# Patient Record
Sex: FEMALE | Race: OTHER | NOT HISPANIC OR LATINO | URBAN - METROPOLITAN AREA
[De-identification: names, ages, dates, MRNs, and addresses within clinical notes are randomized per-mention and may not be internally consistent; named-entity substitution may affect disease eponyms.]

---

## 2017-03-02 ENCOUNTER — EMERGENCY (EMERGENCY)
Facility: HOSPITAL | Age: 77
LOS: 1 days | Discharge: HOME | End: 2017-03-02
Attending: EMERGENCY MEDICINE | Admitting: EMERGENCY MEDICINE
Payer: MEDICARE

## 2017-03-02 VITALS
HEART RATE: 76 BPM | OXYGEN SATURATION: 95 % | TEMPERATURE: 98 F | SYSTOLIC BLOOD PRESSURE: 160 MMHG | DIASTOLIC BLOOD PRESSURE: 104 MMHG | RESPIRATION RATE: 18 BRPM

## 2017-03-02 VITALS
DIASTOLIC BLOOD PRESSURE: 96 MMHG | OXYGEN SATURATION: 95 % | HEART RATE: 62 BPM | SYSTOLIC BLOOD PRESSURE: 144 MMHG | RESPIRATION RATE: 16 BRPM | TEMPERATURE: 98 F

## 2017-03-02 DIAGNOSIS — R21 RASH AND OTHER NONSPECIFIC SKIN ERUPTION: ICD-10-CM

## 2017-03-02 LAB
APTT BLD: 76.5 SEC — HIGH (ref 27.5–37.4)
BASOPHILS # BLD AUTO: 0.1 K/UL — SIGNIFICANT CHANGE UP (ref 0–0.2)
BASOPHILS NFR BLD AUTO: 1.4 % — SIGNIFICANT CHANGE UP (ref 0–2)
EOSINOPHIL # BLD AUTO: 0.2 K/UL — SIGNIFICANT CHANGE UP (ref 0–0.5)
EOSINOPHIL NFR BLD AUTO: 2.3 % — SIGNIFICANT CHANGE UP (ref 0–6)
HCT VFR BLD CALC: 46.1 % — HIGH (ref 34.5–45)
HGB BLD-MCNC: 15.8 G/DL — HIGH (ref 11.5–15.5)
INR BLD: 1.24 RATIO — HIGH (ref 0.88–1.16)
LYMPHOCYTES # BLD AUTO: 3.9 K/UL — HIGH (ref 1–3.3)
LYMPHOCYTES # BLD AUTO: 44.4 % — HIGH (ref 13–44)
MCHC RBC-ENTMCNC: 33.5 PG — SIGNIFICANT CHANGE UP (ref 27–34)
MCHC RBC-ENTMCNC: 34.2 GM/DL — SIGNIFICANT CHANGE UP (ref 32–36)
MCV RBC AUTO: 98 FL — SIGNIFICANT CHANGE UP (ref 80–100)
MONOCYTES # BLD AUTO: 0.7 K/UL — SIGNIFICANT CHANGE UP (ref 0–0.9)
MONOCYTES NFR BLD AUTO: 8.2 % — SIGNIFICANT CHANGE UP (ref 2–14)
NEUTROPHILS # BLD AUTO: 3.8 K/UL — SIGNIFICANT CHANGE UP (ref 1.8–7.4)
NEUTROPHILS NFR BLD AUTO: 43.7 % — SIGNIFICANT CHANGE UP (ref 43–77)
PLATELET # BLD AUTO: 237 K/UL — SIGNIFICANT CHANGE UP (ref 150–400)
PROTHROM AB SERPL-ACNC: 13.6 SEC — HIGH (ref 10–13.1)
RBC # BLD: 4.7 M/UL — SIGNIFICANT CHANGE UP (ref 3.8–5.2)
RBC # FLD: 12.1 % — SIGNIFICANT CHANGE UP (ref 10.3–14.5)
WBC # BLD: 8.8 K/UL — SIGNIFICANT CHANGE UP (ref 3.8–10.5)
WBC # FLD AUTO: 8.8 K/UL — SIGNIFICANT CHANGE UP (ref 3.8–10.5)

## 2017-03-02 PROCEDURE — 99283 EMERGENCY DEPT VISIT LOW MDM: CPT

## 2017-03-02 PROCEDURE — 85027 COMPLETE CBC AUTOMATED: CPT

## 2017-03-02 PROCEDURE — 99284 EMERGENCY DEPT VISIT MOD MDM: CPT

## 2017-03-02 PROCEDURE — 85730 THROMBOPLASTIN TIME PARTIAL: CPT

## 2017-03-02 PROCEDURE — 85610 PROTHROMBIN TIME: CPT

## 2017-03-02 RX ORDER — DIPHENHYDRAMINE HCL 50 MG
25 CAPSULE ORAL ONCE
Qty: 0 | Refills: 0 | Status: COMPLETED | OUTPATIENT
Start: 2017-03-02 | End: 2017-03-02

## 2017-03-02 RX ADMIN — Medication 25 MILLIGRAM(S): at 19:27

## 2017-03-02 NOTE — ED ADULT NURSE NOTE - CHPI ED SYMPTOMS NEG
no petechia/no inflammation/no scaly patches on skin/no pain/no fever/no decreased eating/drinking/no vomiting/no bruising/no chills

## 2017-03-02 NOTE — ED ADULT NURSE NOTE - OBJECTIVE STATEMENT
Per pt report she has had itching and a rash for the past several days.  Pt is A&Ox4, skin warm and dry, denies fever, chills, sob, dizziness, cough, exposure to plants or new creams.  Rash is on her arms leg and back and appears reddened and flat.

## 2017-03-02 NOTE — ED ADULT NURSE REASSESSMENT NOTE - NS ED NURSE REASSESS COMMENT FT1
Pt d/c to home. Pt denies itching at time of d/c. Pt given referral to dermatologist outpt. Son in law used for translation, pt states this is okay. Safety and comfort maintained while in ED. Pt verbalized understanding of d/c instructions.

## 2017-03-02 NOTE — ED ADULT NURSE NOTE - PMH
Arthritis (ICD9 716.90)    Chronic headaches    High Cholesterol (ICD9 272.0)    Hypertensive Disease (ICD9 401.9)

## 2017-03-02 NOTE — ED PROVIDER NOTE - OBJECTIVE STATEMENT
77 year old female with PMHx of HTN, HLD, presents with itchiness to RUE, b/l legs and back x several days. Pt states itching is worse at night and that she noted diffuse red spots over the areas yesterday. States itchiness feels like "needles" sensation.  Pt is concerned that she has bed bugs- states she saw bugs in her bed. Denies fevers, chills, recent travel, new shampoos/soaps/detergents. No recent N/V, SOB, CP, back pain, urinary complaints. 77 year old female with PMHx of HTN, HLD, presents with itchiness to RUE, b/l ankles and upper back x several days. Pt states itching is worse at night and that she notes red spots at bilateral ankles, right upper extremity,  yesterday. States itchiness feels like "needles" sensation at night. Denies fevers, chills, recent travel, new shampoos/soaps/detergents. No recent N/V, SOB, CP, back pain, urinary complaints.

## 2017-03-02 NOTE — ED PROVIDER NOTE - NS ED MD SCRIBE ATTENDING SCRIBE SECTIONS
INTAKE ASSESSMENT/SCREENINGS/VITAL SIGNS( Pullset)/HISTORY OF PRESENT ILLNESS/HIV/REVIEW OF SYSTEMS/PAST MEDICAL/SURGICAL/SOCIAL HISTORY/PHYSICAL EXAM

## 2017-03-02 NOTE — ED PROVIDER NOTE - SKIN RASH DESCRIPTION
MACULAR/pin point areas on b/l ankles and rue, not rasied, areas of excoriation on upper backs secondary to patient scratching

## 2018-06-27 ENCOUNTER — INPATIENT (INPATIENT)
Facility: HOSPITAL | Age: 78
LOS: 4 days | Discharge: HOME | DRG: 481 | End: 2018-07-02
Attending: INTERNAL MEDICINE | Admitting: INTERNAL MEDICINE
Payer: MEDICARE

## 2018-06-27 VITALS
DIASTOLIC BLOOD PRESSURE: 90 MMHG | OXYGEN SATURATION: 94 % | SYSTOLIC BLOOD PRESSURE: 133 MMHG | TEMPERATURE: 98 F | RESPIRATION RATE: 16 BRPM | HEART RATE: 80 BPM

## 2018-06-27 DIAGNOSIS — R06.02 SHORTNESS OF BREATH: ICD-10-CM

## 2018-06-27 LAB
ALBUMIN SERPL ELPH-MCNC: 4.5 G/DL — SIGNIFICANT CHANGE UP (ref 3.3–5)
ALP SERPL-CCNC: 64 U/L — SIGNIFICANT CHANGE UP (ref 40–120)
ALT FLD-CCNC: 21 U/L — SIGNIFICANT CHANGE UP (ref 10–45)
ANION GAP SERPL CALC-SCNC: 8 MMOL/L — SIGNIFICANT CHANGE UP (ref 5–17)
APTT BLD: 34.1 SEC — SIGNIFICANT CHANGE UP (ref 27.5–37.4)
AST SERPL-CCNC: 28 U/L — SIGNIFICANT CHANGE UP (ref 10–40)
BASOPHILS # BLD AUTO: 0 K/UL — SIGNIFICANT CHANGE UP (ref 0–0.2)
BASOPHILS NFR BLD AUTO: 0.3 % — SIGNIFICANT CHANGE UP (ref 0–2)
BILIRUB SERPL-MCNC: 0.5 MG/DL — SIGNIFICANT CHANGE UP (ref 0.2–1.2)
BLD GP AB SCN SERPL QL: NEGATIVE — SIGNIFICANT CHANGE UP
BUN SERPL-MCNC: 20 MG/DL — SIGNIFICANT CHANGE UP (ref 7–23)
CALCIUM SERPL-MCNC: 9.6 MG/DL — SIGNIFICANT CHANGE UP (ref 8.4–10.5)
CHLORIDE SERPL-SCNC: 98 MMOL/L — SIGNIFICANT CHANGE UP (ref 96–108)
CO2 SERPL-SCNC: 28 MMOL/L — SIGNIFICANT CHANGE UP (ref 22–31)
CREAT SERPL-MCNC: 0.64 MG/DL — SIGNIFICANT CHANGE UP (ref 0.5–1.3)
EOSINOPHIL # BLD AUTO: 0.1 K/UL — SIGNIFICANT CHANGE UP (ref 0–0.5)
EOSINOPHIL NFR BLD AUTO: 1 % — SIGNIFICANT CHANGE UP (ref 0–6)
GAS PNL BLDV: SIGNIFICANT CHANGE UP
GLUCOSE SERPL-MCNC: 114 MG/DL — HIGH (ref 70–99)
HCT VFR BLD CALC: 47.7 % — HIGH (ref 34.5–45)
HGB BLD-MCNC: 16.8 G/DL — HIGH (ref 11.5–15.5)
INR BLD: 1.38 RATIO — HIGH (ref 0.88–1.16)
LYMPHOCYTES # BLD AUTO: 3.5 K/UL — HIGH (ref 1–3.3)
LYMPHOCYTES # BLD AUTO: 37.7 % — SIGNIFICANT CHANGE UP (ref 13–44)
MCHC RBC-ENTMCNC: 35.2 GM/DL — SIGNIFICANT CHANGE UP (ref 32–36)
MCHC RBC-ENTMCNC: 35.4 PG — HIGH (ref 27–34)
MCV RBC AUTO: 101 FL — HIGH (ref 80–100)
MONOCYTES # BLD AUTO: 0.9 K/UL — SIGNIFICANT CHANGE UP (ref 0–0.9)
MONOCYTES NFR BLD AUTO: 9.8 % — SIGNIFICANT CHANGE UP (ref 2–14)
NEUTROPHILS # BLD AUTO: 4.8 K/UL — SIGNIFICANT CHANGE UP (ref 1.8–7.4)
NEUTROPHILS NFR BLD AUTO: 51.3 % — SIGNIFICANT CHANGE UP (ref 43–77)
PLATELET # BLD AUTO: 261 K/UL — SIGNIFICANT CHANGE UP (ref 150–400)
POTASSIUM SERPL-MCNC: 3.9 MMOL/L — SIGNIFICANT CHANGE UP (ref 3.5–5.3)
POTASSIUM SERPL-SCNC: 3.9 MMOL/L — SIGNIFICANT CHANGE UP (ref 3.5–5.3)
PROT SERPL-MCNC: 8 G/DL — SIGNIFICANT CHANGE UP (ref 6–8.3)
PROTHROM AB SERPL-ACNC: 15 SEC — HIGH (ref 9.8–12.7)
RBC # BLD: 4.74 M/UL — SIGNIFICANT CHANGE UP (ref 3.8–5.2)
RBC # FLD: 12.2 % — SIGNIFICANT CHANGE UP (ref 10.3–14.5)
RH IG SCN BLD-IMP: POSITIVE — SIGNIFICANT CHANGE UP
SODIUM SERPL-SCNC: 134 MMOL/L — LOW (ref 135–145)
TROPONIN T, HIGH SENSITIVITY RESULT: 10 NG/L — SIGNIFICANT CHANGE UP (ref 0–51)
WBC # BLD: 9.4 K/UL — SIGNIFICANT CHANGE UP (ref 3.8–10.5)
WBC # FLD AUTO: 9.4 K/UL — SIGNIFICANT CHANGE UP (ref 3.8–10.5)

## 2018-06-27 PROCEDURE — 73502 X-RAY EXAM HIP UNI 2-3 VIEWS: CPT | Mod: 26,RT

## 2018-06-27 PROCEDURE — 99285 EMERGENCY DEPT VISIT HI MDM: CPT | Mod: 25,GC

## 2018-06-27 PROCEDURE — 73551 X-RAY EXAM OF FEMUR 1: CPT | Mod: 26,RT

## 2018-06-27 PROCEDURE — 93010 ELECTROCARDIOGRAM REPORT: CPT | Mod: GC

## 2018-06-27 PROCEDURE — 71045 X-RAY EXAM CHEST 1 VIEW: CPT | Mod: 26

## 2018-06-27 PROCEDURE — 71275 CT ANGIOGRAPHY CHEST: CPT | Mod: 26

## 2018-06-27 RX ORDER — ATENOLOL 25 MG/1
25 TABLET ORAL DAILY
Qty: 0 | Refills: 0 | Status: DISCONTINUED | OUTPATIENT
Start: 2018-06-27 | End: 2018-06-29

## 2018-06-27 RX ORDER — MORPHINE SULFATE 50 MG/1
4 CAPSULE, EXTENDED RELEASE ORAL ONCE
Qty: 0 | Refills: 0 | Status: DISCONTINUED | OUTPATIENT
Start: 2018-06-27 | End: 2018-06-27

## 2018-06-27 RX ORDER — CALCIUM ACETATE 667 MG
667 TABLET ORAL AT BEDTIME
Qty: 0 | Refills: 0 | Status: DISCONTINUED | OUTPATIENT
Start: 2018-06-27 | End: 2018-06-29

## 2018-06-27 RX ORDER — ATORVASTATIN CALCIUM 80 MG/1
40 TABLET, FILM COATED ORAL AT BEDTIME
Qty: 0 | Refills: 0 | Status: DISCONTINUED | OUTPATIENT
Start: 2018-06-27 | End: 2018-06-29

## 2018-06-27 RX ORDER — CHOLECALCIFEROL (VITAMIN D3) 125 MCG
1000 CAPSULE ORAL DAILY
Qty: 0 | Refills: 0 | Status: DISCONTINUED | OUTPATIENT
Start: 2018-06-27 | End: 2018-06-29

## 2018-06-27 RX ORDER — ACETAMINOPHEN 500 MG
1000 TABLET ORAL ONCE
Qty: 0 | Refills: 0 | Status: COMPLETED | OUTPATIENT
Start: 2018-06-27 | End: 2018-06-27

## 2018-06-27 RX ORDER — VALSARTAN 80 MG/1
160 TABLET ORAL DAILY
Qty: 0 | Refills: 0 | Status: DISCONTINUED | OUTPATIENT
Start: 2018-06-27 | End: 2018-06-29

## 2018-06-27 RX ORDER — SODIUM CHLORIDE 9 MG/ML
1000 INJECTION INTRAMUSCULAR; INTRAVENOUS; SUBCUTANEOUS
Qty: 0 | Refills: 0 | Status: DISCONTINUED | OUTPATIENT
Start: 2018-06-27 | End: 2018-06-29

## 2018-06-27 RX ORDER — SODIUM CHLORIDE 9 MG/ML
3 INJECTION INTRAMUSCULAR; INTRAVENOUS; SUBCUTANEOUS ONCE
Qty: 0 | Refills: 0 | Status: COMPLETED | OUTPATIENT
Start: 2018-06-27 | End: 2018-06-27

## 2018-06-27 RX ORDER — AMLODIPINE BESYLATE 2.5 MG/1
5 TABLET ORAL DAILY
Qty: 0 | Refills: 0 | Status: DISCONTINUED | OUTPATIENT
Start: 2018-06-27 | End: 2018-06-29

## 2018-06-27 RX ORDER — SODIUM CHLORIDE 9 MG/ML
1000 INJECTION, SOLUTION INTRAVENOUS
Qty: 0 | Refills: 0 | Status: DISCONTINUED | OUTPATIENT
Start: 2018-06-27 | End: 2018-06-29

## 2018-06-27 RX ORDER — CEFTRIAXONE 500 MG/1
1 INJECTION, POWDER, FOR SOLUTION INTRAMUSCULAR; INTRAVENOUS ONCE
Qty: 0 | Refills: 0 | Status: DISCONTINUED | OUTPATIENT
Start: 2018-06-27 | End: 2018-06-27

## 2018-06-27 RX ORDER — HYDROCHLOROTHIAZIDE 25 MG
12.5 TABLET ORAL DAILY
Qty: 0 | Refills: 0 | Status: DISCONTINUED | OUTPATIENT
Start: 2018-06-27 | End: 2018-06-27

## 2018-06-27 RX ADMIN — Medication 1000 MILLIGRAM(S): at 23:00

## 2018-06-27 RX ADMIN — SODIUM CHLORIDE 70 MILLILITER(S): 9 INJECTION INTRAMUSCULAR; INTRAVENOUS; SUBCUTANEOUS at 14:27

## 2018-06-27 RX ADMIN — Medication 400 MILLIGRAM(S): at 22:44

## 2018-06-27 RX ADMIN — Medication 667 MILLIGRAM(S): at 22:44

## 2018-06-27 RX ADMIN — Medication 400 MILLIGRAM(S): at 14:27

## 2018-06-27 RX ADMIN — SODIUM CHLORIDE 70 MILLILITER(S): 9 INJECTION INTRAMUSCULAR; INTRAVENOUS; SUBCUTANEOUS at 22:44

## 2018-06-27 RX ADMIN — SODIUM CHLORIDE 3 MILLILITER(S): 9 INJECTION INTRAMUSCULAR; INTRAVENOUS; SUBCUTANEOUS at 12:18

## 2018-06-27 RX ADMIN — MORPHINE SULFATE 4 MILLIGRAM(S): 50 CAPSULE, EXTENDED RELEASE ORAL at 12:19

## 2018-06-27 RX ADMIN — ATORVASTATIN CALCIUM 40 MILLIGRAM(S): 80 TABLET, FILM COATED ORAL at 22:44

## 2018-06-27 NOTE — ED PROVIDER NOTE - MEDICAL DECISION MAKING DETAILS
Attending note. Fall with obvious deformity to right hip/proximal femur was likely fracture. Patient with shortness of breath and hypoxia.  Consider fat embolus. Transferred patient from [facility] to [facility] for further evaluation. Analgesia now.
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
## 2018-06-27 NOTE — ED ADULT NURSE NOTE - ED STAT RN HANDOFF DETAILS
report given to ADITHYA Amaya in [place]. Pt. transferred w/ full tank of O2. Aware pt. is on 4L NC.
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
## 2018-06-27 NOTE — ED PROVIDER NOTE - CARE PLAN
Principal Discharge DX:	Shortness of breath  Secondary Diagnosis:	Intertrochanteric fracture of right femur, closed, initial encounter

## 2018-06-27 NOTE — ED ADULT NURSE REASSESSMENT NOTE - NS ED NURSE REASSESS COMMENT FT1
report received from Fast Track ADITHYA Rodriguez. Pt states pain is "better" after medication administration; oxygen administered via nc at 4L. Pt transported to Ct for imaging, will reassess upon return to unit

## 2018-06-27 NOTE — ED PROVIDER NOTE - PHYSICAL EXAMINATION
Attending note. Patient is alert and in severe pain. Head is normocephalic and atraumatic. Pupils are 3 mm equal reactive. There is no cervical, thoracic or lumbar tenderness. Chest left ribs are nontender. Lungs are clear equal bilaterally. Heart is regular rate and rhythm without murmur. Abdomen is soft and nontender. Pelvis is nontender. Patient is able to move upper extremities with out pain or tenderness. Patient is tenderness and obvious deformity of the right hip. There is no other tenderness in the lower extremities. Distal pulses were intact. Sensation is intact. Patient has good capillary refill.

## 2018-06-27 NOTE — ED PROVIDER NOTE - PROGRESS NOTE DETAILS
transfer of care to me from ft. prox femur fx likely, now hypoxic - unclear reason. concern for fat embolism. -Marcel Díaz MD- transfer of care to me from ft. prox femur fx likely, now hypoxic - unclear reason. concern for fat embolism. on eliquis, no cardiac / pulm hx. -Marcel Díaz MD- d/w orthopedics for intertrochanteric fx, will see pt, advised of hypoxia episode, will cont to monitor.

## 2018-06-28 ENCOUNTER — TRANSCRIPTION ENCOUNTER (OUTPATIENT)
Age: 78
End: 2018-06-28

## 2018-06-28 LAB
ANION GAP SERPL CALC-SCNC: 14 MMOL/L — SIGNIFICANT CHANGE UP (ref 5–17)
APTT BLD: 27.2 SEC — LOW (ref 27.5–37.4)
BUN SERPL-MCNC: 14 MG/DL — SIGNIFICANT CHANGE UP (ref 7–23)
CALCIUM SERPL-MCNC: 8.9 MG/DL — SIGNIFICANT CHANGE UP (ref 8.4–10.5)
CHLORIDE SERPL-SCNC: 92 MMOL/L — LOW (ref 96–108)
CHOLEST SERPL-MCNC: 153 MG/DL — SIGNIFICANT CHANGE UP (ref 10–199)
CO2 SERPL-SCNC: 27 MMOL/L — SIGNIFICANT CHANGE UP (ref 22–31)
CREAT SERPL-MCNC: 0.56 MG/DL — SIGNIFICANT CHANGE UP (ref 0.5–1.3)
GLUCOSE SERPL-MCNC: 87 MG/DL — SIGNIFICANT CHANGE UP (ref 70–99)
HCT VFR BLD CALC: 45.8 % — HIGH (ref 34.5–45)
HDLC SERPL-MCNC: 64 MG/DL — SIGNIFICANT CHANGE UP (ref 40–125)
HGB BLD-MCNC: 15.5 G/DL — SIGNIFICANT CHANGE UP (ref 11.5–15.5)
INR BLD: 1.15 RATIO — SIGNIFICANT CHANGE UP (ref 0.88–1.16)
LIPID PNL WITH DIRECT LDL SERPL: 68 MG/DL — SIGNIFICANT CHANGE UP
MCHC RBC-ENTMCNC: 33.7 GM/DL — SIGNIFICANT CHANGE UP (ref 32–36)
MCHC RBC-ENTMCNC: 33.7 PG — SIGNIFICANT CHANGE UP (ref 27–34)
MCV RBC AUTO: 99.8 FL — SIGNIFICANT CHANGE UP (ref 80–100)
PLATELET # BLD AUTO: 214 K/UL — SIGNIFICANT CHANGE UP (ref 150–400)
POTASSIUM SERPL-MCNC: 3.2 MMOL/L — LOW (ref 3.5–5.3)
POTASSIUM SERPL-SCNC: 3.2 MMOL/L — LOW (ref 3.5–5.3)
PROTHROM AB SERPL-ACNC: 12.5 SEC — SIGNIFICANT CHANGE UP (ref 9.8–12.7)
RBC # BLD: 4.59 M/UL — SIGNIFICANT CHANGE UP (ref 3.8–5.2)
RBC # FLD: 12.1 % — SIGNIFICANT CHANGE UP (ref 10.3–14.5)
SODIUM SERPL-SCNC: 133 MMOL/L — LOW (ref 135–145)
TOTAL CHOLESTEROL/HDL RATIO MEASUREMENT: 2.4 RATIO — LOW (ref 3.3–7.1)
TRIGL SERPL-MCNC: 107 MG/DL — SIGNIFICANT CHANGE UP (ref 10–149)
TSH SERPL-MCNC: 0.7 UIU/ML — SIGNIFICANT CHANGE UP (ref 0.27–4.2)
WBC # BLD: 9.3 K/UL — SIGNIFICANT CHANGE UP (ref 3.8–10.5)
WBC # FLD AUTO: 9.3 K/UL — SIGNIFICANT CHANGE UP (ref 3.8–10.5)

## 2018-06-28 PROCEDURE — 93010 ELECTROCARDIOGRAM REPORT: CPT

## 2018-06-28 RX ORDER — POTASSIUM CHLORIDE 20 MEQ
40 PACKET (EA) ORAL ONCE
Qty: 0 | Refills: 0 | Status: COMPLETED | OUTPATIENT
Start: 2018-06-28 | End: 2018-06-28

## 2018-06-28 RX ORDER — ACETAMINOPHEN 500 MG
1000 TABLET ORAL ONCE
Qty: 0 | Refills: 0 | Status: COMPLETED | OUTPATIENT
Start: 2018-06-28 | End: 2018-06-28

## 2018-06-28 RX ADMIN — VALSARTAN 160 MILLIGRAM(S): 80 TABLET ORAL at 05:26

## 2018-06-28 RX ADMIN — Medication 40 MILLIEQUIVALENT(S): at 11:07

## 2018-06-28 RX ADMIN — ATENOLOL 25 MILLIGRAM(S): 25 TABLET ORAL at 05:26

## 2018-06-28 RX ADMIN — Medication 400 MILLIGRAM(S): at 11:08

## 2018-06-28 RX ADMIN — AMLODIPINE BESYLATE 5 MILLIGRAM(S): 2.5 TABLET ORAL at 05:26

## 2018-06-28 RX ADMIN — Medication 1000 MILLIGRAM(S): at 11:38

## 2018-06-28 RX ADMIN — Medication 400 MILLIGRAM(S): at 19:34

## 2018-06-28 RX ADMIN — Medication 667 MILLIGRAM(S): at 21:30

## 2018-06-28 RX ADMIN — ATORVASTATIN CALCIUM 40 MILLIGRAM(S): 80 TABLET, FILM COATED ORAL at 21:30

## 2018-06-28 RX ADMIN — Medication 1000 UNIT(S): at 11:08

## 2018-06-28 RX ADMIN — SODIUM CHLORIDE 70 MILLILITER(S): 9 INJECTION INTRAMUSCULAR; INTRAVENOUS; SUBCUTANEOUS at 05:42

## 2018-06-28 RX ADMIN — Medication 1000 MILLIGRAM(S): at 20:04

## 2018-06-28 RX ADMIN — Medication 40 MILLIEQUIVALENT(S): at 19:03

## 2018-06-28 RX ADMIN — SODIUM CHLORIDE 50 MILLILITER(S): 9 INJECTION, SOLUTION INTRAVENOUS at 00:07

## 2018-06-28 NOTE — CONSULT NOTE ADULT - SUBJECTIVE AND OBJECTIVE BOX
78y Female community ambulatory presents c/o R/L hip pain and inability to ambulate sp mechanical fall. Denies HS/LOC. Denies numbness/tingling. Denies fever/chills. Denies pain/injury elsewhere. Pt [place] previously for possible total knee replacement options 2/2 OA. No other orthopedic concerns at this time.     Shortness of breath  Handoff  MEWS Score  Chronic headaches  Arthritis (ICD9 716.90)  High Cholesterol (ICD9 272.0)  Hypertensive Disease (ICD9 401.9)  Shortness of breath  No Past Medical History  FALL/ RIGHT HIP INJURY  90+  Intertrochanteric fracture of right femur, closed, initial encounter    PAST MEDICAL & SURGICAL HISTORY:  Chronic headaches  Arthritis (ICD9 716.90)  High Cholesterol (ICD9 272.0)  Hypertensive Disease (ICD9 401.9)  No Past Medical History    MEDICATIONS  (STANDING):  amLODIPine   Tablet 5 milliGRAM(s) Oral daily  ATENolol  Tablet 25 milliGRAM(s) Oral daily  atorvastatin 40 milliGRAM(s) Oral at bedtime  calcium acetate 667 milliGRAM(s) Oral at bedtime  cholecalciferol 1000 Unit(s) Oral daily  lactated ringers. 1000 milliLiter(s) IV Continuous <Continuous>  sodium chloride 0.9%. 1000 milliLiter(s) IV Continuous <Continuous>  valsartan 160 milliGRAM(s) Oral daily    Allergies  No Known Allergies  Intolerances                        16.8   9.4   )-----------( 261      ( 27 Jun 2018 12:12 )             47.7     27 Jun 2018 12:12    134    |  98     |  20     ----------------------------<  114    3.9     |  28     |  0.64     Ca    9.6        27 Jun 2018 12:12    TPro  8.0    /  Alb  4.5    /  TBili  0.5    /  DBili  x      /  AST  28     /  ALT  21     /  AlkPhos  64     27 Jun 2018 12:12  PT/INR - ( 27 Jun 2018 12:12 )   PT: 15.0 sec;   INR: 1.38 ratio    PTT - ( 27 Jun 2018 12:12 )  PTT:34.1 sec    Vital Signs Last 24 Hrs  T(C): 36.6 (06-27-18 @ 20:20), Max: 36.7 (06-27-18 @ 11:30)  T(F): 97.8 (06-27-18 @ 20:20), Max: 98 (06-27-18 @ 11:30)  HR: 78 (06-27-18 @ 20:20) (71 - 80)  BP: 123/86 (06-27-18 @ 20:20) (123/86 - 149/94)  BP(mean): --  RR: 18 (06-27-18 @ 20:20) (16 - 20)  SpO2: 96% (06-27-18 @ 20:20) (89% - 97%)    Imaging: XR demonstrates R IT Fx    Physical Exam  General: NAD, Alert, Awake and oriented  Neurologic: No gross deficits, moving all 4s.  Head: NCAT without abrasions, lacerations, or ecchymosis to head, face, or scalp    RIGHT LE: No open skin. ExternallyRotated and shortened. No deformities or other signs of trauma at  knee, lower leg, ankle or foot. Full baseline painless ROM at ankle and toes with. EHL/FHL/TA/GSC 5/5,  SILT L3-S1, 2+ DP/PT pulses with brisk cap refill distally. Compartments soft and compressible.     Secondary Survey: No TTP over bony prominences, SILT, palpable pulses, full/painless range of motion, compartments soft

## 2018-06-28 NOTE — PROGRESS NOTE ADULT - SUBJECTIVE AND OBJECTIVE BOX
_________________________________________________________________________________________  ========>>  M E D I C A L   A T T E N D I N G    F O L L O W  U P  N O T E  <<=========  -----------------------------------------------------------------------------------------------------    - Patient seen and examined by me approximately thirty minutes ago.  - In summary, patient is a 78y year old woman who originally presented post fall, hip fracture   - Patient today overall doing ok, comfortable, eating OK. pain controlled with no movement!     ==================>> REVIEW OF SYSTEM <<=================    GEN: no fever, no chills, no pain a rest   RESP: no SOB, no cough, no sputum  CVS: no chest pain, no palpitations, no edema  GI: no abdominal pain, no nausea, no constipation, no diarrhea  : no dysuria, no frequency, no hematuria  Neuro: no headache, no dizziness  Derm : no itching, no rash    ==================>> PHYSICAL EXAM <<=================    GEN: A&O X 3 , NAD , comfortable  HEENT: NCAT, PERRL, MMM, hearing intact  Neck: supple , no JVD  CVS: S1S2 , regular , No M/R/G appreciated  PULM: CTA B/L,  no W/R/R appreciated  ABD.: soft. non tender, non distended,  bowel sounds present  Extrem: intact pulses , no edema   PSYCH : normal mood,  a bit anxious /nervous       ==================>> MEDICATIONS <<====================    MEDICATIONS  (STANDING):  acetaminophen  IVPB. 1000 milliGRAM(s) IV Intermittent once  amLODIPine   Tablet 5 milliGRAM(s) Oral daily  ATENolol  Tablet 25 milliGRAM(s) Oral daily  atorvastatin 40 milliGRAM(s) Oral at bedtime  calcium acetate 667 milliGRAM(s) Oral at bedtime  cholecalciferol 1000 Unit(s) Oral daily  lactated ringers. 1000 milliLiter(s) (50 mL/Hr) IV Continuous <Continuous>  potassium chloride    Tablet ER 40 milliEquivalent(s) Oral once  sodium chloride 0.9%. 1000 milliLiter(s) (70 mL/Hr) IV Continuous <Continuous>  valsartan 160 milliGRAM(s) Oral daily    ==================>> VITAL SIGNS <<==================    T(C): 36.8 (06-28-18 @ 04:27), Max: 36.8 (06-28-18 @ 04:27)  HR: 80 (06-28-18 @ 04:27) (71 - 80)  BP: 144/93 (06-28-18 @ 04:27) (123/86 - 149/94)  RR: 18 (06-28-18 @ 04:27) (16 - 20)  SpO2: 94% (06-28-18 @ 04:27) (89% - 97%)    I&O's Summary    27 Jun 2018 07:01  -  28 Jun 2018 07:00  --------------------------------------------------------  IN: 600 mL / OUT: 450 mL / NET: 150 mL    28 Jun 2018 07:01  -  28 Jun 2018 10:35  --------------------------------------------------------  IN: 240 mL / OUT: 200 mL / NET: 40 mL    ==================>> LAB AND IMAGING <<==================                        15.5   9.3   )-----------( 214      ( 28 Jun 2018 05:53 )             45.8     Hemoglobin:   15.5 <<==,  16.8 <<==    133<L>  |  92<L>  |  14  ----------------------------<  87  3.2<L>   |  27  |  0.56    Sodium:   133  <==, 134  <==    Ca    8.9      28 Jun 2018 05:53    TPro  8.0  /  Alb  4.5  /  TBili  0.5  /  DBili  x   /  AST  28  /  ALT  21  /  AlkPhos  64  06-27    PT/INR - ( 28 Jun 2018 05:53 )   PT: 12.5 sec;   INR: 1.15 ratio    PTT - ( 28 Jun 2018 05:53 )  PTT:27.2 sec              imaging reviewed   ___________________________________________________________________________________  ===============>>  A S S E S S M E N T   A N D   P L A N <<===============  ------------------------------------------------------------------------------------------    **fall, likely mechanical in nature, with Rt hip fracture  --- surgical plan / repair per ortho  ---pain mgmt  ---DVT PPX  ---eliquis on hold   --- cardio f/u and clearance for OR  --- supplementing hypokalemia, stable hyponatremia  ---otherwise medically stable for OR    **Afib, stable  ---holding AC as above for Sx  --- rate control  ---monitor vitals closely  ---cardio f/u    **Arthritis   pt was being worker up and prepared for a knee replacement for severere OA of knee  [place] f.u  Pain mgmt  PT post op    **High Cholesterol   --- Continue Current medications and monitor.     **Hypertension  ---Continue with antihypertensive medications as above  ---DASH diet   ---close monitoring of vitals    **hyponatremia, possible SIADH due to pain,improved  -- encourage PO intkae  -- monitor  -- supplementing hypokalemia monitor     -GI/DVT Prophylaxis.    --------------------------------------------  Case discussed with pt, Dtr, Rn  Education given on findings and plan of care.  ___________________________  Will follow with you.  Thank you,  SHAZIA Casas D.O.  Pager: [phone]

## 2018-06-28 NOTE — PROGRESS NOTE ADULT - ASSESSMENT
A/P: 78F w/ R IT Fx  pain control  NWB RLE, bedrest  Hold Eliquis  Medical optimization/clearance   NPO at mid night for OR  IVF while NPO  Plan for OR 6/29

## 2018-06-28 NOTE — PROGRESS NOTE ADULT - SUBJECTIVE AND OBJECTIVE BOX
- Patient seen and examined.  - In summary, patient is a 78 year old woman who presented with hip fx, a.fib (27 Jun 2018 17:44)  - Today, patient is without complaints.         *****MEDICATIONS:    MEDICATIONS  (STANDING):  amLODIPine   Tablet 5 milliGRAM(s) Oral daily  ATENolol  Tablet 25 milliGRAM(s) Oral daily  atorvastatin 40 milliGRAM(s) Oral at bedtime  calcium acetate 667 milliGRAM(s) Oral at bedtime  cholecalciferol 1000 Unit(s) Oral daily  lactated ringers. 1000 milliLiter(s) (50 mL/Hr) IV Continuous <Continuous>  sodium chloride 0.9%. 1000 milliLiter(s) (70 mL/Hr) IV Continuous <Continuous>  valsartan 160 milliGRAM(s) Oral daily    MEDICATIONS  (PRN):           ***** REVIEW OF SYSTEM:  GEN: no fever, no chills, no pain  RESP: no SOB, no cough, no sputum  CVS: no chest pain, no palpitations, no edema  GI: no abdominal pain, no nausea, no vomiting, no constipation, no diarrhea  : no dysuria, no frequency  NEURO: no headache, no dizziness  PSYCH: no depression, not anxious  Derm : no itching, no rash         ***** VITAL SIGNS:  T(F): 98.3 (06-28-18 @ 04:27), Max: 98.3 (06-28-18 @ 04:27)  HR: 80 (06-28-18 @ 04:27) (71 - 80)  BP: 144/93 (06-28-18 @ 04:27) (123/86 - 149/94)  RR: 18 (06-28-18 @ 04:27) (16 - 20)  SpO2: 94% (06-28-18 @ 04:27) (89% - 97%)  Wt(kg): --  ,   I&O's Summary    27 Jun 2018 07:01  -  28 Jun 2018 07:00  --------------------------------------------------------  IN: 600 mL / OUT: 450 mL / NET: 150 mL    28 Jun 2018 07:01  -  28 Jun 2018 11:13  --------------------------------------------------------  IN: 240 mL / OUT: 200 mL / NET: 40 mL             *****PHYSICAL EXAM:  GEN: A&O X 3 , NAD , comfortable  HEENT: NCAT, EOMI, MMM, no icterus  NECK: Supple, No JVD  CVS: S1S2 , regular , No M/R/G appreciated  PULM: CTA B/L,  no W/R/R appreciated  ABD.: soft. non tender, non distended,  bowel sounds present  Extrem: intact pulses , no edema noted  Derm: No rash or ecchymosis noted  PSYCH: normal mood, no depression, not anxious         *****LAB AND IMAGING:                        15.5   9.3   )-----------( 214      ( 28 Jun 2018 05:53 )             45.8               06-28    133<L>  |  92<L>  |  14  ----------------------------<  87  3.2<L>   |  27  |  0.56    Ca    8.9      28 Jun 2018 05:53    TPro  8.0  /  Alb  4.5  /  TBili  0.5  /  DBili  x   /  AST  28  /  ALT  21  /  AlkPhos  64  06-27    PT/INR - ( 28 Jun 2018 05:53 )   PT: 12.5 sec;   INR: 1.15 ratio         PTT - ( 28 Jun 2018 05:53 )  PTT:27.2 sec                     [All pertinent recent Imaging/Reports reviewed]         *****A S S E S S M E N T   A N D   P L A N :  78F with hx of a.fib, htn adm s/p fall.  XR shows acute comminuted intertrochanteric fracture of the right femur  ortho eval noted  planned for OR  eliquis being held  continue current meds  pain controlled    __________________________  SAMY Haskins D.O.

## 2018-06-28 NOTE — PROGRESS NOTE ADULT - SUBJECTIVE AND OBJECTIVE BOX
Pt S/E at bedside, no acute events overnight, pain controlled    Vital Signs Last 24 Hrs  T(C): 36.8 (28 Jun 2018 04:27), Max: 36.8 (28 Jun 2018 04:27)  T(F): 98.3 (28 Jun 2018 04:27), Max: 98.3 (28 Jun 2018 04:27)  HR: 80 (28 Jun 2018 04:27) (71 - 80)  BP: 144/93 (28 Jun 2018 04:27) (123/86 - 149/94)  RR: 18 (28 Jun 2018 04:27) (16 - 20)  SpO2: 94% (28 Jun 2018 04:27) (89% - 97%)    Gen: NAD, AAOx3    Right Lower Extremity:  Skin intact  +EHL/FHL/TA/GS  SILT L3-S1  +DP/PT Pulses  Compartments soft  No calf TTP B/L

## 2018-06-28 NOTE — CHART NOTE - NSCHARTNOTEFT_GEN_A_CORE
pt with hx of chronic a.fib with s/p hip fx  pt denies of any chest pain or sob and is very active.  pt is clear for hip surgery with mod risk  AC post op   monitor hr

## 2018-06-29 LAB
ANION GAP SERPL CALC-SCNC: 13 MMOL/L — SIGNIFICANT CHANGE UP (ref 5–17)
ANION GAP SERPL CALC-SCNC: 16 MMOL/L — SIGNIFICANT CHANGE UP (ref 5–17)
APTT BLD: 26.5 SEC — LOW (ref 27.5–37.4)
BUN SERPL-MCNC: 11 MG/DL — SIGNIFICANT CHANGE UP (ref 7–23)
BUN SERPL-MCNC: 9 MG/DL — SIGNIFICANT CHANGE UP (ref 7–23)
CALCIUM SERPL-MCNC: 8.3 MG/DL — LOW (ref 8.4–10.5)
CALCIUM SERPL-MCNC: 8.9 MG/DL — SIGNIFICANT CHANGE UP (ref 8.4–10.5)
CHLORIDE SERPL-SCNC: 90 MMOL/L — LOW (ref 96–108)
CHLORIDE SERPL-SCNC: 92 MMOL/L — LOW (ref 96–108)
CO2 SERPL-SCNC: 25 MMOL/L — SIGNIFICANT CHANGE UP (ref 22–31)
CO2 SERPL-SCNC: 27 MMOL/L — SIGNIFICANT CHANGE UP (ref 22–31)
CREAT SERPL-MCNC: 0.51 MG/DL — SIGNIFICANT CHANGE UP (ref 0.5–1.3)
CREAT SERPL-MCNC: 0.62 MG/DL — SIGNIFICANT CHANGE UP (ref 0.5–1.3)
GLUCOSE SERPL-MCNC: 135 MG/DL — HIGH (ref 70–99)
GLUCOSE SERPL-MCNC: 195 MG/DL — HIGH (ref 70–99)
HCT VFR BLD CALC: 45.7 % — HIGH (ref 34.5–45)
HCT VFR BLD CALC: 49.3 % — HIGH (ref 34.5–45)
HGB BLD-MCNC: 15.3 G/DL — SIGNIFICANT CHANGE UP (ref 11.5–15.5)
HGB BLD-MCNC: 15.7 G/DL — HIGH (ref 11.5–15.5)
INR BLD: 1.04 RATIO — SIGNIFICANT CHANGE UP (ref 0.88–1.16)
MCHC RBC-ENTMCNC: 31 GM/DL — LOW (ref 32–36)
MCHC RBC-ENTMCNC: 31.4 PG — SIGNIFICANT CHANGE UP (ref 27–34)
MCHC RBC-ENTMCNC: 33.3 PG — SIGNIFICANT CHANGE UP (ref 27–34)
MCHC RBC-ENTMCNC: 34.4 GM/DL — SIGNIFICANT CHANGE UP (ref 32–36)
MCV RBC AUTO: 101 FL — HIGH (ref 80–100)
MCV RBC AUTO: 97 FL — SIGNIFICANT CHANGE UP (ref 80–100)
PLATELET # BLD AUTO: 193 K/UL — SIGNIFICANT CHANGE UP (ref 150–400)
PLATELET # BLD AUTO: 219 K/UL — SIGNIFICANT CHANGE UP (ref 150–400)
POTASSIUM SERPL-MCNC: 4.4 MMOL/L — SIGNIFICANT CHANGE UP (ref 3.5–5.3)
POTASSIUM SERPL-MCNC: 4.7 MMOL/L — SIGNIFICANT CHANGE UP (ref 3.5–5.3)
POTASSIUM SERPL-SCNC: 4.4 MMOL/L — SIGNIFICANT CHANGE UP (ref 3.5–5.3)
POTASSIUM SERPL-SCNC: 4.7 MMOL/L — SIGNIFICANT CHANGE UP (ref 3.5–5.3)
PROTHROM AB SERPL-ACNC: 11.8 SEC — SIGNIFICANT CHANGE UP (ref 10–13.1)
RBC # BLD: 4.71 M/UL — SIGNIFICANT CHANGE UP (ref 3.8–5.2)
RBC # BLD: 4.88 M/UL — SIGNIFICANT CHANGE UP (ref 3.8–5.2)
RBC # FLD: 12.1 % — SIGNIFICANT CHANGE UP (ref 10.3–14.5)
RBC # FLD: 13.2 % — SIGNIFICANT CHANGE UP (ref 10.3–14.5)
SODIUM SERPL-SCNC: 131 MMOL/L — LOW (ref 135–145)
SODIUM SERPL-SCNC: 132 MMOL/L — LOW (ref 135–145)
WBC # BLD: 11.27 K/UL — HIGH (ref 3.8–10.5)
WBC # BLD: 14.7 K/UL — HIGH (ref 3.8–10.5)
WBC # FLD AUTO: 11.27 K/UL — HIGH (ref 3.8–10.5)
WBC # FLD AUTO: 14.7 K/UL — HIGH (ref 3.8–10.5)

## 2018-06-29 PROCEDURE — 71045 X-RAY EXAM CHEST 1 VIEW: CPT | Mod: 26

## 2018-06-29 PROCEDURE — 73552 X-RAY EXAM OF FEMUR 2/>: CPT | Mod: 26,RT

## 2018-06-29 PROCEDURE — 73502 X-RAY EXAM HIP UNI 2-3 VIEWS: CPT | Mod: 26,RT

## 2018-06-29 RX ORDER — APIXABAN 2.5 MG/1
2.5 TABLET, FILM COATED ORAL EVERY 12 HOURS
Qty: 0 | Refills: 0 | Status: DISCONTINUED | OUTPATIENT
Start: 2018-06-29 | End: 2018-06-29

## 2018-06-29 RX ORDER — OXYCODONE HYDROCHLORIDE 5 MG/1
10 TABLET ORAL EVERY 4 HOURS
Qty: 0 | Refills: 0 | Status: DISCONTINUED | OUTPATIENT
Start: 2018-06-29 | End: 2018-07-02

## 2018-06-29 RX ORDER — MORPHINE SULFATE 50 MG/1
2 CAPSULE, EXTENDED RELEASE ORAL EVERY 4 HOURS
Qty: 0 | Refills: 0 | Status: DISCONTINUED | OUTPATIENT
Start: 2018-06-29 | End: 2018-07-02

## 2018-06-29 RX ORDER — CALCIUM ACETATE 667 MG
667 TABLET ORAL AT BEDTIME
Qty: 0 | Refills: 0 | Status: DISCONTINUED | OUTPATIENT
Start: 2018-06-29 | End: 2018-07-02

## 2018-06-29 RX ORDER — ACETAMINOPHEN 500 MG
650 TABLET ORAL EVERY 6 HOURS
Qty: 0 | Refills: 0 | Status: DISCONTINUED | OUTPATIENT
Start: 2018-06-29 | End: 2018-07-02

## 2018-06-29 RX ORDER — CEFAZOLIN SODIUM 1 G
2000 VIAL (EA) INJECTION EVERY 8 HOURS
Qty: 0 | Refills: 0 | Status: COMPLETED | OUTPATIENT
Start: 2018-06-29 | End: 2018-06-29

## 2018-06-29 RX ORDER — AMLODIPINE BESYLATE 2.5 MG/1
5 TABLET ORAL DAILY
Qty: 0 | Refills: 0 | Status: DISCONTINUED | OUTPATIENT
Start: 2018-06-29 | End: 2018-07-02

## 2018-06-29 RX ORDER — POLYETHYLENE GLYCOL 3350 17 G/17G
17 POWDER, FOR SOLUTION ORAL DAILY
Qty: 0 | Refills: 0 | Status: DISCONTINUED | OUTPATIENT
Start: 2018-06-29 | End: 2018-07-02

## 2018-06-29 RX ORDER — SODIUM CHLORIDE 9 MG/ML
1000 INJECTION, SOLUTION INTRAVENOUS
Qty: 0 | Refills: 0 | Status: DISCONTINUED | OUTPATIENT
Start: 2018-06-29 | End: 2018-07-01

## 2018-06-29 RX ORDER — SENNA PLUS 8.6 MG/1
2 TABLET ORAL AT BEDTIME
Qty: 0 | Refills: 0 | Status: DISCONTINUED | OUTPATIENT
Start: 2018-06-29 | End: 2018-07-02

## 2018-06-29 RX ORDER — ONDANSETRON 8 MG/1
4 TABLET, FILM COATED ORAL ONCE
Qty: 0 | Refills: 0 | Status: DISCONTINUED | OUTPATIENT
Start: 2018-06-29 | End: 2018-06-29

## 2018-06-29 RX ORDER — OXYCODONE HYDROCHLORIDE 5 MG/1
5 TABLET ORAL EVERY 4 HOURS
Qty: 0 | Refills: 0 | Status: DISCONTINUED | OUTPATIENT
Start: 2018-06-29 | End: 2018-07-02

## 2018-06-29 RX ORDER — ATENOLOL 25 MG/1
25 TABLET ORAL DAILY
Qty: 0 | Refills: 0 | Status: DISCONTINUED | OUTPATIENT
Start: 2018-06-29 | End: 2018-07-02

## 2018-06-29 RX ORDER — HYDROMORPHONE HYDROCHLORIDE 2 MG/ML
0.5 INJECTION INTRAMUSCULAR; INTRAVENOUS; SUBCUTANEOUS
Qty: 0 | Refills: 0 | Status: DISCONTINUED | OUTPATIENT
Start: 2018-06-29 | End: 2018-06-29

## 2018-06-29 RX ORDER — HYDROMORPHONE HYDROCHLORIDE 2 MG/ML
0.25 INJECTION INTRAMUSCULAR; INTRAVENOUS; SUBCUTANEOUS
Qty: 0 | Refills: 0 | Status: DISCONTINUED | OUTPATIENT
Start: 2018-06-29 | End: 2018-07-02

## 2018-06-29 RX ORDER — DIPHENHYDRAMINE HCL 50 MG
25 CAPSULE ORAL AT BEDTIME
Qty: 0 | Refills: 0 | Status: DISCONTINUED | OUTPATIENT
Start: 2018-06-29 | End: 2018-07-02

## 2018-06-29 RX ORDER — HEPARIN SODIUM 5000 [USP'U]/ML
5000 INJECTION INTRAVENOUS; SUBCUTANEOUS EVERY 12 HOURS
Qty: 0 | Refills: 0 | Status: DISCONTINUED | OUTPATIENT
Start: 2018-06-29 | End: 2018-06-29

## 2018-06-29 RX ORDER — HYDROCHLOROTHIAZIDE 25 MG
12.5 TABLET ORAL DAILY
Qty: 0 | Refills: 0 | Status: DISCONTINUED | OUTPATIENT
Start: 2018-06-29 | End: 2018-07-02

## 2018-06-29 RX ORDER — APIXABAN 2.5 MG/1
5 TABLET, FILM COATED ORAL EVERY 12 HOURS
Qty: 0 | Refills: 0 | Status: DISCONTINUED | OUTPATIENT
Start: 2018-06-29 | End: 2018-07-02

## 2018-06-29 RX ORDER — ATORVASTATIN CALCIUM 80 MG/1
40 TABLET, FILM COATED ORAL AT BEDTIME
Qty: 0 | Refills: 0 | Status: DISCONTINUED | OUTPATIENT
Start: 2018-06-29 | End: 2018-07-02

## 2018-06-29 RX ORDER — DOCUSATE SODIUM 100 MG
100 CAPSULE ORAL THREE TIMES A DAY
Qty: 0 | Refills: 0 | Status: DISCONTINUED | OUTPATIENT
Start: 2018-06-29 | End: 2018-07-02

## 2018-06-29 RX ORDER — BENZOCAINE AND MENTHOL 5; 1 G/100ML; G/100ML
1 LIQUID ORAL EVERY 6 HOURS
Qty: 0 | Refills: 0 | Status: DISCONTINUED | OUTPATIENT
Start: 2018-06-29 | End: 2018-07-02

## 2018-06-29 RX ORDER — HEPARIN SODIUM 5000 [USP'U]/ML
5000 INJECTION INTRAVENOUS; SUBCUTANEOUS EVERY 8 HOURS
Qty: 0 | Refills: 0 | Status: DISCONTINUED | OUTPATIENT
Start: 2018-06-29 | End: 2018-06-29

## 2018-06-29 RX ORDER — ONDANSETRON 8 MG/1
4 TABLET, FILM COATED ORAL EVERY 6 HOURS
Qty: 0 | Refills: 0 | Status: DISCONTINUED | OUTPATIENT
Start: 2018-06-29 | End: 2018-07-02

## 2018-06-29 RX ORDER — CHOLECALCIFEROL (VITAMIN D3) 125 MCG
1000 CAPSULE ORAL DAILY
Qty: 0 | Refills: 0 | Status: DISCONTINUED | OUTPATIENT
Start: 2018-06-29 | End: 2018-07-02

## 2018-06-29 RX ADMIN — ATENOLOL 25 MILLIGRAM(S): 25 TABLET ORAL at 05:20

## 2018-06-29 RX ADMIN — SODIUM CHLORIDE 50 MILLILITER(S): 9 INJECTION, SOLUTION INTRAVENOUS at 12:00

## 2018-06-29 RX ADMIN — Medication 100 MILLIGRAM(S): at 23:48

## 2018-06-29 RX ADMIN — SODIUM CHLORIDE 50 MILLILITER(S): 9 INJECTION, SOLUTION INTRAVENOUS at 00:35

## 2018-06-29 RX ADMIN — Medication 1 TABLET(S): at 21:30

## 2018-06-29 RX ADMIN — Medication 100 MILLIGRAM(S): at 14:50

## 2018-06-29 RX ADMIN — HEPARIN SODIUM 5000 UNIT(S): 5000 INJECTION INTRAVENOUS; SUBCUTANEOUS at 17:55

## 2018-06-29 RX ADMIN — HYDROMORPHONE HYDROCHLORIDE 0.25 MILLIGRAM(S): 2 INJECTION INTRAMUSCULAR; INTRAVENOUS; SUBCUTANEOUS at 11:45

## 2018-06-29 RX ADMIN — Medication 100 MILLIGRAM(S): at 21:30

## 2018-06-29 RX ADMIN — VALSARTAN 160 MILLIGRAM(S): 80 TABLET ORAL at 05:20

## 2018-06-29 RX ADMIN — APIXABAN 5 MILLIGRAM(S): 2.5 TABLET, FILM COATED ORAL at 19:35

## 2018-06-29 RX ADMIN — AMLODIPINE BESYLATE 5 MILLIGRAM(S): 2.5 TABLET ORAL at 05:20

## 2018-06-29 RX ADMIN — HYDROMORPHONE HYDROCHLORIDE 0.25 MILLIGRAM(S): 2 INJECTION INTRAMUSCULAR; INTRAVENOUS; SUBCUTANEOUS at 11:55

## 2018-06-29 RX ADMIN — Medication 1 TABLET(S): at 14:50

## 2018-06-29 RX ADMIN — ATORVASTATIN CALCIUM 40 MILLIGRAM(S): 80 TABLET, FILM COATED ORAL at 21:30

## 2018-06-29 RX ADMIN — Medication 12.5 MILLIGRAM(S): at 14:50

## 2018-06-29 RX ADMIN — Medication 100 MILLIGRAM(S): at 16:04

## 2018-06-29 RX ADMIN — Medication 667 MILLIGRAM(S): at 21:30

## 2018-06-29 NOTE — PROGRESS NOTE ADULT - SUBJECTIVE AND OBJECTIVE BOX
- Patient seen and examined.  - In summary, patient is a 78 year old woman who presented with hip fx, a.fib (27 Jun 2018 17:44)  - Today, patient is without complaints.         *****MEDICATIONS:    MEDICATIONS  (STANDING):  amLODIPine   Tablet 5 milliGRAM(s) Oral daily  ATENolol  Tablet 25 milliGRAM(s) Oral daily  atorvastatin 40 milliGRAM(s) Oral at bedtime  calcium acetate 667 milliGRAM(s) Oral at bedtime  cholecalciferol 1000 Unit(s) Oral daily  lactated ringers. 1000 milliLiter(s) (50 mL/Hr) IV Continuous <Continuous>  sodium chloride 0.9%. 1000 milliLiter(s) (70 mL/Hr) IV Continuous <Continuous>  valsartan 160 milliGRAM(s) Oral daily    MEDICATIONS  (PRN):           ***** REVIEW OF SYSTEM:  GEN: no fever, no chills, no pain  RESP: no SOB, no cough, no sputum  CVS: no chest pain, no palpitations, no edema  GI: no abdominal pain, no nausea, no vomiting, no constipation, no diarrhea  : no dysuria, no frequency  NEURO: no headache, no dizziness  PSYCH: no depression, not anxious  Derm : no itching, no rash         ***** VITAL SIGNS:    T(F): 98.7 (06-29-18 @ 06:27), Max: 98.9 (06-28-18 @ 20:27)  HR: 81 (06-29-18 @ 04:38) (70 - 84)  BP: 131/89 (06-29-18 @ 06:27) (122/73 - 131/89)  RR: 18 (06-29-18 @ 06:20) (18 - 18)  SpO2: 95% (06-29-18 @ 06:27) (95% - 96%)  Wt(kg): --  ,   I&O's Summary    28 Jun 2018 07:01  -  29 Jun 2018 07:00  --------------------------------------------------------  IN: 2270 mL / OUT: 1500 mL / NET: 770 mL             *****PHYSICAL EXAM:  GEN: A&O X 3 , NAD , comfortable  HEENT: NCAT, EOMI, MMM, no icterus  NECK: Supple, No JVD  CVS: S1S2 , regular , No M/R/G appreciated  PULM: CTA B/L,  no W/R/R appreciated  ABD.: soft. non tender, non distended,  bowel sounds present  Extrem: intact pulses , no edema noted  Derm: No rash or ecchymosis noted  PSYCH: normal mood, no depression, not anxious         *****LAB AND IMAGING:                                          15.7   11.27 )-----------( 193      ( 29 Jun 2018 08:17 )             45.7               06-29    132<L>  |  92<L>  |  9   ----------------------------<  135<H>  4.4   |  27  |  0.51    Ca    8.9      29 Jun 2018 06:01    TPro  8.0  /  Alb  4.5  /  TBili  0.5  /  DBili  x   /  AST  28  /  ALT  21  /  AlkPhos  64  06-27    PT/INR - ( 28 Jun 2018 05:53 )   PT: 12.5 sec;   INR: 1.15 ratio         PTT - ( 28 Jun 2018 05:53 )  PTT:27.2 sec                       [All pertinent recent Imaging/Reports reviewed]         *****A S S E S S M E N T   A N D   P L A N :  78F with hx of a.fib, htn adm s/p fall.  XR shows acute comminuted intertrochanteric fracture of the right femur  ortho eval noted  now in OR  resume eliquis post op as allowable  continue current meds  PT as able  __________________________  SAMY Haskins D.O.

## 2018-06-29 NOTE — CONSULT NOTE ADULT - ASSESSMENT
A/P: 78y Female with R IT hip Fx  Pain control  NWB RLE  Medical and cardiac optimization for OR  Hold Eliquis  Plan for OR 6/29  NPO at midnight for OR  IVF while NPO  hold chemical DVT ppx midnight  D/w attending Dr. Hernández and agrees w/ above plan
Post mechanical fall R Intertranchanteric hip fracture, now postop IM nail  Borderline hypoxemia preop: 88% on 2 l/min nasal O2, yet on Eliquis at home, with limited CTA (negative major PE) ane no parenchymal disease  Current Sats are appropriate post op    REC    CXR ordered  Monitor oxygenation  Resume AC once cleared by ortho: sugg prophylaxis until then

## 2018-06-29 NOTE — BRIEF OPERATIVE NOTE - PROCEDURE
<<-----Click on this checkbox to enter Procedure Insertion of intramedullary nail  06/29/2018    Active  [id number]

## 2018-06-29 NOTE — PROGRESS NOTE ADULT - SUBJECTIVE AND OBJECTIVE BOX
Pt S/E at bedside, no acute events overnight, pain controlled    Vital Signs Last 24 Hrs  T(C): 37.1 (29 Jun 2018 06:27), Max: 37.2 (28 Jun 2018 20:27)  T(F): 98.7 (29 Jun 2018 06:27), Max: 98.9 (28 Jun 2018 20:27)  HR: 81 (29 Jun 2018 04:38) (70 - 84)  BP: 131/89 (29 Jun 2018 06:27) (122/73 - 131/89)  RR: 18 (29 Jun 2018 06:20) (18 - 18)  SpO2: 95% (29 Jun 2018 06:27) (95% - 96%)    Gen: NAD, AAOx3    Right Lower Extremity:  Skin intact  +EHL/FHL/TA/GS  SILT L3-S1  +DP/PT Pulses  Compartments soft  No calf TTP B/L

## 2018-06-29 NOTE — CHART NOTE - NSCHARTNOTEFT_GEN_A_CORE
Patient resting without complaints.  Denies chest pain, SOB, N/V.   Son bedside.    T(C): 36.3 (06-29-18 @ 12:00)  T(F): 97.3 (06-29-18 @ 12:00)  HR: 73 (06-29-18 @ 12:45)  BP: 125/72 (06-29-18 @ 12:45)  RR: 18 (06-29-18 @ 12:45)  SpO2: 95% (06-29-18 @ 12:45)      Exam:  Alert and Oriented, No Acute Distress    Cardio: Irregularly Irregular S1,S2    Pulm: CTA B/L    R Lower Extremity:  Hip Dsgs CDI  Calf Soft, Non-tender  +PF/DF  Sensation grossly intact  +DP Pulse                          15.3   14.7  )-----------( 219      ( 29 Jun 2018 11:43 )             49.3        131<L>  |  90<L>  |  11  ----------------------------<  195<H>  4.7   |  25  |  0.62      Post-op R Femur XR done.     A/P: 78y Female s/p R femur IMN; Stable    -Pain Control  -DVT PPx; IS  -Am labs  -PT Eval-WBAT RLE  -Continue Current Tx.    Pam Swanson PA-C  Orthopedic Surgery  Pagers [phone]

## 2018-06-29 NOTE — CONSULT NOTE ADULT - SUBJECTIVE AND OBJECTIVE BOX
PULMONARY CONSULT  Wei Pool MD  [phone]    Initial HPI on admission:  HPI:  CHIEF COMPLAINT:Patient is a 78y old  Female who presents with a chief complaint of s/p fall.    HPI:Patient was seen in fast track room #5. Patient was crossing the street this morning and fell. Patient complains of severe pain in the right hip. Patient denies any other injury. Patient is complaining of some shortness of breath. Patient has no chest pain. Patient has no previous history of pulmonary disease. Patient has a history of bradycardia, hypertension and hypercholesterolemia. Patient is taking Tylenol, atenolol, valsartan hydrochlorothiazide, L. gross, Crestor, Nexium, calcium and vitamin D. Patient denies any numbness or paresthesia. Patient is unable to move her right leg to to severe right hip pain.  On arrival to the ER patient's oxygen saturation was 88% on 2 L nasal cannula oxygen      PAST MEDICAL & SURGICAL HISTORY:  Chronic headaches  Arthritis (ICD9 716.90)  High Cholesterol (ICD9 272.0)  Hypertensive Disease (ICD9 401.9)  No Past Medical History      MEDICATIONS  (STANDING):  sodium chloride 0.9%. 1000 milliLiter(s) (70 mL/Hr) IV Continuous <Continuous>    MEDICATIONS  (PRN):      FAMILY HISTORY:      SOCIAL HISTORY:    [ ] Non-smoker  [ ] Smoker  [ ] Alcohol    Allergies    No Known Allergies    Intolerances    	    REVIEW OF SYSTEMS:  CONSTITUTIONAL: No fever, weight loss, or fatigue  EYES: No eye pain, visual disturbances, or discharge  ENT:  No difficulty hearing, tinnitus, vertigo; No sinus or throat pain  NECK: No pain or stiffness  RESPIRATORY: No cough, wheezing, chills or hemoptysis; + Shortness of Breath  CARDIOVASCULAR: No chest pain, palpitations, passing out, dizziness, or leg swelling  GASTROINTESTINAL: No abdominal or epigastric pain. No nausea, vomiting, or hematemesis; No diarrhea or constipation. No melena or hematochezia.  GENITOURINARY: No dysuria, frequency, hematuria, or incontinence  NEUROLOGICAL: No headaches, memory loss, loss of strength, numbness, or tremors  SKIN: No itching, burning, rashes, or lesions   LYMPH Nodes: No enlarged glands  ENDOCRINE: No heat or cold intolerance; No hair loss  MUSCULOSKELETAL: No joint pain or swelling; No muscle, back, or extremity pain, + r hip pain  PSYCHIATRIC: No depression, anxiety, mood swings, or difficulty sleeping  HEME/LYMPH: No easy bruising, or bleeding gums  ALLERGY AND IMMUNOLOGIC: No hives or eczema	    [ ] All others negative	  [ ] Unable to obtain    PHYSICAL EXAM:  T(C): 36.7 (06-27-18 @ 11:30), Max: 36.7 (06-27-18 @ 11:30)  HR: 71 (06-27-18 @ 12:15) (71 - 80)  BP: 145/92 (06-27-18 @ 12:15) (133/90 - 149/94)  RR: 20 (06-27-18 @ 12:15) (16 - 20)  SpO2: 97% (06-27-18 @ 12:15) (89% - 97%)  Wt(kg): --  I&O's Summary      Appearance: Normal	  HEENT:   Normal oral mucosa, PERRL, EOMI	  Lymphatic: No lymphadenopathy  Cardiovascular: Normal S1 S2, No JVD, + murmurs, No edema  Respiratory: Lungs clear to auscultation	  Psychiatry: A & O x 3, Mood & affect appropriate  Gastrointestinal:  Soft, Non-tender, + BS	  Skin: No rashes, No ecchymoses, No cyanosis	  Neurologic: Non-focal  Extremities: Normal range of motion, No clubbing, cyanosis or edema  Vascular: Peripheral pulses palpable 2+ bilaterally    TELEMETRY: 	    ECG:  	  RADIOLOGY:  OTHER: 	  	  LABS:	 	    CARDIAC MARKERS:                        16.8   9.4   )-----------( 261      ( 27 Jun 2018 12:12 )             47.7     06-27    134<L>  |  98  |  20  ----------------------------<  114<H>  3.9   |  28  |  0.64    Ca    9.6      27 Jun 2018 12:12    TPro  8.0  /  Alb  4.5  /  TBili  0.5  /  DBili  x   /  AST  28  /  ALT  21  /  AlkPhos  64  06-27    proBNP:   Lipid Profile:   HgA1c:   TSH:   PT/INR - ( 27 Jun 2018 12:12 )   PT: 15.0 sec;   INR: 1.38 ratio         PTT - ( 27 Jun 2018 12:12 )  PTT:34.1 sec    PREVIOUS DIAGNOSTIC TESTING:      < from: TTE with Doppler (w/Cont) (07.26.13 @ 16:15) >  1. Mitral annular calcification, otherwise normal mitral  valve. Mild mitral regurgitation.  2. Severely dilated left atrium.  LA volume index = 55  cc/m2.  3. Normal left ventricular internal dimensions and wall  thicknesses.  4. Endocardium not well visualized; grossly normal left  ventricular systolic function.  Endocardial visualization  enhanced with intravenous injection of echo contrast  (Definity).  5. Normal right ventricular size and function.  6. Estimated right ventricular systolic pressure equals 35  mm Hg, assuming right atrial pressure equals 10 mm Hg,  consistent with borderline pulmonary hypertension. (27 Jun 2018 17:44)      BRIEF HOSPITAL COURSE: ***    PAST MEDICAL & SURGICAL HISTORY:  Chronic headaches  Arthritis (ICD9 716.90)  High Cholesterol (ICD9 272.0)  Hypertensive Disease (ICD9 401.9)  No Past Medical History    Allergies    No Known Allergies    Intolerances      FAMILY HISTORY:  No Known Allergies    Intolerances    SOCIAL HISTORY:  Alcohol: None reported  Non smoker    REVIEW OF SYSTEMS:  CONSTITUTIONAL: No fever, weight loss, or fatigue  EYES: No eye pain, visual disturbances, or discharge  ENT:  No difficulty hearing, tinnitus, vertigo; No sinus or throat pain  NECK: No pain or stiffness  RESPIRATORY: No cough, wheezing, chills or hemoptysis; + Shortness of Breath  CARDIOVASCULAR: No chest pain, palpitations, passing out, dizziness, or leg swelling  GASTROINTESTINAL: No abdominal or epigastric pain. No nausea, vomiting, or hematemesis; No diarrhea or constipation. No melena or hematochezia.  GENITOURINARY: No dysuria, frequency, hematuria, or incontinence  NEUROLOGICAL: No headaches, memory loss, loss of strength, numbness, or tremors  SKIN: No itching, burning, rashes, or lesions   LYMPH Nodes: No enlarged glands  ENDOCRINE: No heat or cold intolerance; No hair loss  MUSCULOSKELETAL: No joint pain or swelling; No muscle, back, or extremity pain, + r hip pain  PSYCHIATRIC: No depression, anxiety, mood swings, or difficulty sleeping  HEME/LYMPH: No easy bruising, or bleeding gums  ALLERGY AND IMMUNOLOGIC: No hives or eczema	    Medications:  MEDICATIONS  (STANDING):  amLODIPine   Tablet 5 milliGRAM(s) Oral daily  apixaban 2.5 milliGRAM(s) Oral every 12 hours  ATENolol  Tablet 25 milliGRAM(s) Oral daily  atorvastatin 40 milliGRAM(s) Oral at bedtime  calcium acetate 667 milliGRAM(s) Oral at bedtime  calcium carbonate 1250 mG + Vitamin D (OsCal 500 + D) 1 Tablet(s) Oral three times a day  ceFAZolin   IVPB 2000 milliGRAM(s) IV Intermittent every 8 hours  cholecalciferol 1000 Unit(s) Oral daily  docusate sodium 100 milliGRAM(s) Oral three times a day  hydrochlorothiazide 12.5 milliGRAM(s) Oral daily  lactated ringers. 1000 milliLiter(s) (50 mL/Hr) IV Continuous <Continuous>  multivitamin 1 Tablet(s) Oral daily  polyethylene glycol 3350 17 Gram(s) Oral daily    MEDICATIONS  (PRN):  acetaminophen   Tablet 650 milliGRAM(s) Oral every 6 hours PRN For Temp greater than 38 C (100.4 F)  acetaminophen   Tablet 650 milliGRAM(s) Oral every 6 hours PRN headache  acetaminophen   Tablet. 650 milliGRAM(s) Oral every 6 hours PRN pain  aluminum hydroxide/magnesium hydroxide/simethicone Suspension 30 milliLiter(s) Oral four times a day PRN Indigestion  benzocaine 15 mG/menthol 3.6 mG Lozenge 1 Lozenge Oral every 6 hours PRN Sore Throat  diphenhydrAMINE   Capsule 25 milliGRAM(s) Oral at bedtime PRN Insomnia  HYDROmorphone  Injectable 0.25 milliGRAM(s) IV Push every 10 minutes PRN Moderate Pain (4 - 6)  morphine  - Injectable 2 milliGRAM(s) IV Push every 4 hours PRN breakthrough pain  ondansetron Injectable 4 milliGRAM(s) IV Push once PRN Nausea and/or Vomiting  ondansetron Injectable 4 milliGRAM(s) IV Push every 6 hours PRN Nausea and/or Vomiting  oxyCODONE    IR 5 milliGRAM(s) Oral every 4 hours PRN Moderate Pain (4 - 6)  oxyCODONE    IR 10 milliGRAM(s) Oral every 4 hours PRN Severe Pain (7 - 10)  senna 2 Tablet(s) Oral at bedtime PRN Constipation    Vital Signs Last 24 Hrs  T(C): 36.3 (29 Jun 2018 12:00), Max: 37.2 (28 Jun 2018 20:27)  T(F): 97.3 (29 Jun 2018 12:00), Max: 98.9 (28 Jun 2018 20:27)  HR: 73 (29 Jun 2018 12:45) (70 - 89)  BP: 125/72 (29 Jun 2018 12:45) (100/59 - 149/73)  BP(mean): 94 (29 Jun 2018 12:45) (74 - 101)  RR: 18 (29 Jun 2018 12:45) (18 - 25)  SpO2: 95% (29 Jun 2018 12:45) (90% - 97%)          06-28 @ 07:01  -  06-29 @ 07:00  --------------------------------------------------------  IN: 2270 mL / OUT: 1500 mL / NET: 770 mL      LABS:                        15.3   14.7  )-----------( 219      ( 29 Jun 2018 11:43 )             49.3     06-29    131<L>  |  90<L>  |  11  ----------------------------<  195<H>  4.7   |  25  |  0.62    Ca    8.3<L>      29 Jun 2018 11:43        PT/INR - ( 29 Jun 2018 09:46 )   PT: 11.8 sec;   INR: 1.04 ratio         PTT - ( 29 Jun 2018 09:46 )  PTT:26.5 sec          CULTURES:        Physical Examination:    General: Non Toxic. Supine in PACU, postop,. No acute distress.  O2 sat 97% on 3 l/min nasal    HEENT: Pupils equal, reactive to light.  Symmetric.    PULM: Clear to auscultation bilaterally, no significant sputum production    CVS: Regular rate and rhythm, no murmurs, rubs, or gallops    ABD: Soft, nondistended, nontender, normoactive bowel sounds, no masses    EXT: No edema, nontender    SKIN: Warm and well perfused, no rashes noted.    NEURO: Alert, oriented, interactive, nonfocal    RADIOLOGY REVIEWED PERSONALLY  CXR: pending    CT chest: Motion artifact: no central PE    TTE: Mild MR, PA 35, LV limited      Assessment:    Plan:

## 2018-06-29 NOTE — BRIEF OPERATIVE NOTE - PRE-OP DX
Closed fracture of femur, intertrochanteric, right, initial encounter  06/29/2018    Active  Orlando Blair

## 2018-06-29 NOTE — PROGRESS NOTE ADULT - SUBJECTIVE AND OBJECTIVE BOX
_________________________________________________________________________________________  ========>>  M E D I C A L   A T T E N D I N G    F O L L O W  U P  N O T E  <<=========  -----------------------------------------------------------------------------------------------------    - Patient seen and examined by me approximately thirty minutes ago.  - In summary, patient is a 78y year old woman who originally presented post fall, hip fracture   - Patient see post op in PACU, comfortable, though per RN O2 sat low, now on O2 via mask    ==================>> REVIEW OF SYSTEM <<=================    GEN: no fever, no chills, no pain a rest   RESP: no SOB, no cough, no sputum  CVS: no chest pain, no palpitations, no edema  GI: no abdominal pain, no nausea, no constipation, no diarrhea  : no dysuria, no frequency, no hematuria  Neuro: no headache, no dizziness  Derm : no itching, no rash    ==================>> PHYSICAL EXAM <<=================    GEN: A&O X 3 , NAD , comfortable  HEENT: NCAT, PERRL, MMM, hearing intact  Neck: supple , no JVD  CVS: S1S2 , regular , No M/R/G appreciated  PULM: CTA B/L,  no W/R/R appreciated  ABD.: soft. non tender, non distended,  bowel sounds present  Extrem: intact pulses , no edema, wound dressed and iced   PSYCH : normal mood,  a bit anxious /nervous       ==================>> MEDICATIONS <<====================    MEDICATIONS  (STANDING):  acetaminophen  IVPB. 1000 milliGRAM(s) IV Intermittent once  amLODIPine   Tablet 5 milliGRAM(s) Oral daily  ATENolol  Tablet 25 milliGRAM(s) Oral daily  atorvastatin 40 milliGRAM(s) Oral at bedtime  calcium acetate 667 milliGRAM(s) Oral at bedtime  cholecalciferol 1000 Unit(s) Oral daily  lactated ringers. 1000 milliLiter(s) (50 mL/Hr) IV Continuous <Continuous>  potassium chloride    Tablet ER 40 milliEquivalent(s) Oral once  sodium chloride 0.9%. 1000 milliLiter(s) (70 mL/Hr) IV Continuous <Continuous>  valsartan 160 milliGRAM(s) Oral daily    ==================>> VITAL SIGNS <<==================    Vital Signs Last 24 Hrs  T(C): 36.3 (06-29-18 @ 12:00)  T(F): 97.3 (06-29-18 @ 12:00), Max: 98.9 (06-28-18 @ 20:27)  HR: 71 (06-29-18 @ 12:30) (70 - 89)  BP: 107/63 (06-29-18 @ 12:30)  BP(mean): 81 (06-29-18 @ 12:30) (74 - 101)  RR: 20 (06-29-18 @ 12:30) (18 - 25)  SpO2: 92% (06-29-18 @ 12:30) (90% - 97%)      ==================>> LAB AND IMAGING <<==================                                    15.3   14.7  )-----------( 219      ( 29 Jun 2018 11:43 )             49.3     131<L>  |  90<L>  |  11  ----------------------------<  195<H>  4.7   |  25  |  0.62    Sodium:   131  <==, 132  <==, 133  <==, 134  <==    Ca    8.3<L>      29 Jun 2018 11:43    PT/INR - ( 29 Jun 2018 09:46 )   PT: 11.8 sec;   INR: 1.04 ratio    PTT - ( 29 Jun 2018 09:46 )  PTT:26.5 sec               TSH:      0.70   (06-28-18)           Lipid profile:  (06-28-18)     Total: 153     LDL  : 68     HDL  :64     TG   :107  ___________________________________________________________________________________  ===============>>  A S S E S S M E N T   A N D   P L A N <<===============  ------------------------------------------------------------------------------------------    **fall, likely mechanical in nature, with Rt hip fracture  --- post op   ---pain mgmt  --- PT  ---DVT PPX  ---eliquis on hold     **Afib, stable  ---holding AC as above for Sx  --- rate control  ---monitor vitals closely  ---cardio f/u    **Arthritis   pt was being worker up and prepared for a knee replacement for severere OA of knee  [place]  Pain mgmt  PT     **Hypoxemia, unclear etiology  --- O2 supplement  --- decrease Narcotic use as able  ---Pulm Consult requested    **High Cholesterol   --- Continue Current medications and monitor.     **Hypertension  ---Continue with antihypertensive medications as above  ---DASH diet   ---close monitoring of vitals    **hyponatremia, possible SIADH due to pain,improved  -- encourage PO intkae  -- monitor  -- supplementing hypokalemia monitor     -GI/DVT Prophylaxis.    --------------------------------------------  Case discussed with pt, son, Rn  Education given on findings and plan of care.  ___________________________  Will follow with you.  Thank you,  SHAZIA Casas D.O.  Pager: [phone]

## 2018-06-29 NOTE — PROGRESS NOTE ADULT - ASSESSMENT
A/P: 78F w/ R IT Fx  pain control  NWB RLE, bedrest  Hold Eliquis  NPO for OR  Hold chem DVT ppx  IVF while NPO  Plan OR today 6/29

## 2018-06-30 LAB
ANION GAP SERPL CALC-SCNC: 12 MMOL/L — SIGNIFICANT CHANGE UP (ref 5–17)
BUN SERPL-MCNC: 16 MG/DL — SIGNIFICANT CHANGE UP (ref 7–23)
CALCIUM SERPL-MCNC: 9 MG/DL — SIGNIFICANT CHANGE UP (ref 8.4–10.5)
CHLORIDE SERPL-SCNC: 94 MMOL/L — LOW (ref 96–108)
CO2 SERPL-SCNC: 28 MMOL/L — SIGNIFICANT CHANGE UP (ref 22–31)
CREAT SERPL-MCNC: 0.68 MG/DL — SIGNIFICANT CHANGE UP (ref 0.5–1.3)
GLUCOSE SERPL-MCNC: 127 MG/DL — HIGH (ref 70–99)
HCT VFR BLD CALC: 40 % — SIGNIFICANT CHANGE UP (ref 34.5–45)
HGB BLD-MCNC: 13.7 G/DL — SIGNIFICANT CHANGE UP (ref 11.5–15.5)
MCHC RBC-ENTMCNC: 33.4 PG — SIGNIFICANT CHANGE UP (ref 27–34)
MCHC RBC-ENTMCNC: 34.3 GM/DL — SIGNIFICANT CHANGE UP (ref 32–36)
MCV RBC AUTO: 97.6 FL — SIGNIFICANT CHANGE UP (ref 80–100)
PLATELET # BLD AUTO: 194 K/UL — SIGNIFICANT CHANGE UP (ref 150–400)
POTASSIUM SERPL-MCNC: 4.1 MMOL/L — SIGNIFICANT CHANGE UP (ref 3.5–5.3)
POTASSIUM SERPL-SCNC: 4.1 MMOL/L — SIGNIFICANT CHANGE UP (ref 3.5–5.3)
RBC # BLD: 4.1 M/UL — SIGNIFICANT CHANGE UP (ref 3.8–5.2)
RBC # FLD: 13.4 % — SIGNIFICANT CHANGE UP (ref 10.3–14.5)
SODIUM SERPL-SCNC: 134 MMOL/L — LOW (ref 135–145)
WBC # BLD: 10.54 K/UL — HIGH (ref 3.8–10.5)
WBC # FLD AUTO: 10.54 K/UL — HIGH (ref 3.8–10.5)

## 2018-06-30 RX ADMIN — Medication 650 MILLIGRAM(S): at 21:30

## 2018-06-30 RX ADMIN — APIXABAN 5 MILLIGRAM(S): 2.5 TABLET, FILM COATED ORAL at 06:45

## 2018-06-30 RX ADMIN — Medication 1 TABLET(S): at 21:46

## 2018-06-30 RX ADMIN — Medication 100 MILLIGRAM(S): at 21:46

## 2018-06-30 RX ADMIN — APIXABAN 5 MILLIGRAM(S): 2.5 TABLET, FILM COATED ORAL at 16:56

## 2018-06-30 RX ADMIN — Medication 12.5 MILLIGRAM(S): at 05:15

## 2018-06-30 RX ADMIN — Medication 100 MILLIGRAM(S): at 05:14

## 2018-06-30 RX ADMIN — ATORVASTATIN CALCIUM 40 MILLIGRAM(S): 80 TABLET, FILM COATED ORAL at 21:46

## 2018-06-30 RX ADMIN — Medication 1 TABLET(S): at 05:14

## 2018-06-30 RX ADMIN — ATENOLOL 25 MILLIGRAM(S): 25 TABLET ORAL at 05:14

## 2018-06-30 RX ADMIN — Medication 650 MILLIGRAM(S): at 20:54

## 2018-06-30 RX ADMIN — OXYCODONE HYDROCHLORIDE 5 MILLIGRAM(S): 5 TABLET ORAL at 11:57

## 2018-06-30 RX ADMIN — MORPHINE SULFATE 2 MILLIGRAM(S): 50 CAPSULE, EXTENDED RELEASE ORAL at 00:32

## 2018-06-30 RX ADMIN — OXYCODONE HYDROCHLORIDE 5 MILLIGRAM(S): 5 TABLET ORAL at 12:27

## 2018-06-30 RX ADMIN — AMLODIPINE BESYLATE 5 MILLIGRAM(S): 2.5 TABLET ORAL at 05:14

## 2018-06-30 RX ADMIN — Medication 100 MILLIGRAM(S): at 13:04

## 2018-06-30 RX ADMIN — Medication 1000 UNIT(S): at 11:57

## 2018-06-30 RX ADMIN — MORPHINE SULFATE 2 MILLIGRAM(S): 50 CAPSULE, EXTENDED RELEASE ORAL at 01:02

## 2018-06-30 RX ADMIN — Medication 1 TABLET(S): at 13:04

## 2018-06-30 RX ADMIN — Medication 1 TABLET(S): at 11:57

## 2018-06-30 RX ADMIN — Medication 667 MILLIGRAM(S): at 21:46

## 2018-06-30 NOTE — PROGRESS NOTE ADULT - SUBJECTIVE AND OBJECTIVE BOX
_________________________________________________________________________________________  ========>>  M E D I C A L   A T T E N D I N G  (consult)   F O L L O W  U P  N O T E  <<=========  -----------------------------------------------------------------------------------------------------    - Patient seen and examined by me approximately thirty minutes ago.  - In summary, patient is a 78y year old woman who originally presented post fall, hip fracture   - Patient overall doing better, comfortable, pain improved     pt wants to go OOB to bathroom, PT..  ( D/w RN)    ==================>> REVIEW OF SYSTEM <<=================    GEN: no fever, no chills, no pain a rest   RESP: no SOB, no cough, no sputum  CVS: no chest pain, no palpitations, no edema  GI: no abdominal pain, no nausea, no constipation, no diarrhea  : no dysuria, no frequency, no hematuria  Neuro: no headache, no dizziness  Derm : no itching, no rash    ==================>> PHYSICAL EXAM <<=================    GEN: A&O X 3 , NAD , comfortable  HEENT: NCAT, PERRL, MMM, hearing intact  Neck: supple , no JVD  CVS: S1S2 , regular , No M/R/G appreciated  PULM: CTA B/L,  no W/R/R appreciated  ABD.: soft. non tender, non distended,  bowel sounds present  Extrem: intact pulses , no edema, wound dressed and clean  PSYCH : normal mood,  a bit anxious /nervous        ==================>> MEDICATIONS <<====================    amLODIPine   Tablet 5 milliGRAM(s) Oral daily  apixaban 5 milliGRAM(s) Oral every 12 hours  ATENolol  Tablet 25 milliGRAM(s) Oral daily  atorvastatin 40 milliGRAM(s) Oral at bedtime  calcium acetate 667 milliGRAM(s) Oral at bedtime  calcium carbonate 1250 mG + Vitamin D (OsCal 500 + D) 1 Tablet(s) Oral three times a day  cholecalciferol 1000 Unit(s) Oral daily  docusate sodium 100 milliGRAM(s) Oral three times a day  hydrochlorothiazide 12.5 milliGRAM(s) Oral daily  lactated ringers. 1000 milliLiter(s) IV Continuous <Continuous>  multivitamin 1 Tablet(s) Oral daily  polyethylene glycol 3350 17 Gram(s) Oral daily    MEDICATIONS  (PRN):  acetaminophen   Tablet 650 milliGRAM(s) Oral every 6 hours PRN For Temp greater than 38 C (100.4 F)  acetaminophen   Tablet 650 milliGRAM(s) Oral every 6 hours PRN headache  acetaminophen   Tablet. 650 milliGRAM(s) Oral every 6 hours PRN pain  aluminum hydroxide/magnesium hydroxide/simethicone Suspension 30 milliLiter(s) Oral four times a day PRN Indigestion  benzocaine 15 mG/menthol 3.6 mG Lozenge 1 Lozenge Oral every 6 hours PRN Sore Throat  diphenhydrAMINE   Capsule 25 milliGRAM(s) Oral at bedtime PRN Insomnia  HYDROmorphone  Injectable 0.25 milliGRAM(s) IV Push every 10 minutes PRN Moderate Pain (4 - 6)  morphine  - Injectable 2 milliGRAM(s) IV Push every 4 hours PRN breakthrough pain  ondansetron Injectable 4 milliGRAM(s) IV Push every 6 hours PRN Nausea and/or Vomiting  oxyCODONE    IR 5 milliGRAM(s) Oral every 4 hours PRN Moderate Pain (4 - 6)  oxyCODONE    IR 10 milliGRAM(s) Oral every 4 hours PRN Severe Pain (7 - 10)  senna 2 Tablet(s) Oral at bedtime PRN Constipation    ==================>> VITAL SIGNS <<==================    Vital Signs Last 24 Hrs  T(C): 36.9 (06-30-18 @ 03:58)  T(F): 98.5 (06-30-18 @ 03:58), Max: 98.5 (06-30-18 @ 03:58)  HR: 88 (06-30-18 @ 05:10) (69 - 101)  BP: 133/80 (06-30-18 @ 05:10)  BP(mean): 91 (06-29-18 @ 20:02) (74 - 101)  RR: 18 (06-30-18 @ 03:58) (18 - 22)  SpO2: 95% (06-30-18 @ 03:58) (90% - 97%)       ==================>> LAB AND IMAGING <<==================                        13.7   10.54 )-----------( 194      ( 30 Jun 2018 08:29 )             40.0        WBC count:   10.54 <<== ,  14.7 <<== ,  11.27 <<== ,  9.3 <<== ,  9.4 <<==     134<L>  |  94<L>  |  16  ----------------------------<  127<H>  4.1   |  28  |  0.68    Sodium:   134  <==, 131  <==, 132  <==, 133  <==, 134  <==    Ca    9.0      30 Jun 2018 06:49    ___________________________________________________________________________________  ===============>>  A S S E S S M E N T   A N D   P L A N <<===============  ------------------------------------------------------------------------------------------    **fall, likely mechanical in nature, with Rt hip fracture  --- post op   ---pain mgmt  --- PT / OOB  ---DVT PPX  -- Eliquis resumption as able  -- D/W RN, try to get a bedside commode     **Afib, stable  ---holding AC as above for Sx  --- rate control  ---monitor vitals closely  ---cardio f/u    **Arthritis   pt was being worker up and prepared for a knee replacement for severere OA of knee  [place] f.u  Pain mgmt  PT     **Hypoxemia, unclear etiology, improved, pt off O2 now  --- O2 supplement as needed  --- pulm appreciated  -- Incentive spirometer     **Hypertension, HLD  ---Continue with medications as above  ---DASH diet   ---close monitoring of vitals    **hyponatremia, possible SIADH due to pain, improved  -- encourage PO intkae  -- monitor  -- supplementing hypokalemia monitor     -GI/DVT Prophylaxis.    --------------------------------------------  Case discussed with pt, son, Rn  Education given on findings and plan of care.  ___________________________  Will follow with you.  Thank you,  SHAZIA Casas D.O.  Pager: [phone]

## 2018-06-30 NOTE — PROGRESS NOTE ADULT - SUBJECTIVE AND OBJECTIVE BOX
Follow-up Pulm Progress Note  Wei Pool MD  [phone]    No new respiratory events overnight.  Denies SOB/CP.   Breathing comfortably supine on nasal O2: sat 90%  CXR postop: clear  Eliquis resumed    Medications:  Vital Signs Last 24 Hrs  T(C): 36.9 (30 Jun 2018 03:58), Max: 36.9 (30 Jun 2018 03:58)  T(F): 98.5 (30 Jun 2018 03:58), Max: 98.5 (30 Jun 2018 03:58)  HR: 88 (30 Jun 2018 05:10) (69 - 101)  BP: 133/80 (30 Jun 2018 05:10) (100/59 - 149/73)  BP(mean): 91 (29 Jun 2018 20:02) (74 - 101)  RR: 18 (30 Jun 2018 03:58) (18 - 25)  SpO2: 95% (30 Jun 2018 03:58) (90% - 97%)      06-29 @ 07:01  -  06-30 @ 07:00  --------------------------------------------------------  IN: 1210 mL / OUT: 1250 mL / NET: -40 mL        LABS:                        13.7   10.54 )-----------( 194      ( 30 Jun 2018 08:29 )             40.0     06-30    134<L>  |  94<L>  |  16  ----------------------------<  127<H>  4.1   |  28  |  0.68    Ca    9.0      30 Jun 2018 06:49        PT/INR - ( 29 Jun 2018 09:46 )   PT: 11.8 sec;   INR: 1.04 ratio         PTT - ( 29 Jun 2018 09:46 )  PTT:26.5 sec    Physical Examination:  PULM: Clear without wheeze: few basilar crackles  CVS: Regular rate and rhythm, no murmurs, rubs, or gallops  ABD: Soft, non-tender  EXT:  No clubbing, cyanosis, or edema    RADIOLOGY REVIEWED  CXR:    CT chest:    TTE:

## 2018-06-30 NOTE — PROGRESS NOTE ADULT - SUBJECTIVE AND OBJECTIVE BOX
Orthopaedic Surgery Progress Note    Subjective:   Patient seen and examined  No acute events overnight  Pain controlled    Objective:  T(C): 36.9 (06-30-18 @ 03:58), Max: 37.1 (06-29-18 @ 04:38)  HR: 86 (06-30-18 @ 03:58) (69 - 101)  BP: 132/82 (06-30-18 @ 03:58) (100/59 - 149/73)  RR: 18 (06-30-18 @ 03:58) (18 - 25)  SpO2: 95% (06-30-18 @ 03:58) (90% - 97%)  Wt(kg): --    06-28 @ 07:01  -  06-29 @ 07:00  --------------------------------------------------------  IN: 2620 mL / OUT: 1800 mL / NET: 820 mL    06-29 @ 07:01  -  06-30 @ 04:14  --------------------------------------------------------  IN: 990 mL / OUT: 800 mL / NET: 190 mL        PE    NAD  RLE:   dressing C/D/I  motor intact GS/TA/EHL  SILT S/S/SP/DP  WWP                          15.3   14.7  )-----------( 219      ( 29 Jun 2018 11:43 )             49.3     06-29    131<L>  |  90<L>  |  11  ----------------------------<  195<H>  4.7   |  25  |  0.62    Ca    8.3<L>      29 Jun 2018 11:43      PT/INR - ( 29 Jun 2018 09:46 )   PT: 11.8 sec;   INR: 1.04 ratio         PTT - ( 29 Jun 2018 09:46 )  PTT:26.5 sec      78y Female s/p R IMN POD1  - Pain control  - WBAT  - PT/OT/OOB  - AC - eliquis   - Dispo planning

## 2018-06-30 NOTE — PROGRESS NOTE ADULT - ASSESSMENT
O2 requirement likely related to obesity/basilar atelecatsis postop  s/p R IM nail for hip fx    REC    Mobilize  Re check sats on RA
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
## 2018-06-30 NOTE — PHYSICAL THERAPY INITIAL EVALUATION ADULT - ADDITIONAL COMMENTS
PTA pt lived in apt, ramp to enter, +elevator, pt fully independent without AD in the house, used RW in community.

## 2018-06-30 NOTE — PROGRESS NOTE ADULT - SUBJECTIVE AND OBJECTIVE BOX
- Patient seen and examined.  - In summary, patient is a 78 year old woman who presented with hip fx, a.fib (2018 17:44)  - Today, patient is without complaints.         *****MEDICATIONS:    MEDICATIONS  (STANDING):  amLODIPine   Tablet 5 milliGRAM(s) Oral daily  apixaban 5 milliGRAM(s) Oral every 12 hours  ATENolol  Tablet 25 milliGRAM(s) Oral daily  atorvastatin 40 milliGRAM(s) Oral at bedtime  calcium acetate 667 milliGRAM(s) Oral at bedtime  calcium carbonate 1250 mG + Vitamin D (OsCal 500 + D) 1 Tablet(s) Oral three times a day  cholecalciferol 1000 Unit(s) Oral daily  docusate sodium 100 milliGRAM(s) Oral three times a day  hydrochlorothiazide 12.5 milliGRAM(s) Oral daily  lactated ringers. 1000 milliLiter(s) (50 mL/Hr) IV Continuous <Continuous>  multivitamin 1 Tablet(s) Oral daily  polyethylene glycol 3350 17 Gram(s) Oral daily    MEDICATIONS  (PRN):  acetaminophen   Tablet 650 milliGRAM(s) Oral every 6 hours PRN For Temp greater than 38 C (100.4 F)  acetaminophen   Tablet 650 milliGRAM(s) Oral every 6 hours PRN headache  acetaminophen   Tablet. 650 milliGRAM(s) Oral every 6 hours PRN pain  aluminum hydroxide/magnesium hydroxide/simethicone Suspension 30 milliLiter(s) Oral four times a day PRN Indigestion  benzocaine 15 mG/menthol 3.6 mG Lozenge 1 Lozenge Oral every 6 hours PRN Sore Throat  diphenhydrAMINE   Capsule 25 milliGRAM(s) Oral at bedtime PRN Insomnia  HYDROmorphone  Injectable 0.25 milliGRAM(s) IV Push every 10 minutes PRN Moderate Pain (4 - 6)  morphine  - Injectable 2 milliGRAM(s) IV Push every 4 hours PRN breakthrough pain  ondansetron Injectable 4 milliGRAM(s) IV Push every 6 hours PRN Nausea and/or Vomiting  oxyCODONE    IR 5 milliGRAM(s) Oral every 4 hours PRN Moderate Pain (4 - 6)  oxyCODONE    IR 10 milliGRAM(s) Oral every 4 hours PRN Severe Pain (7 - 10)  senna 2 Tablet(s) Oral at bedtime PRN Constipation             ***** REVIEW OF SYSTEM:  GEN: no fever, no chills, no pain  RESP: no SOB, no cough, no sputum  CVS: no chest pain, no palpitations, no edema  GI: no abdominal pain, no nausea, no vomiting, no constipation, no diarrhea  : no dysuria, no frequency  NEURO: no headache, no dizziness  PSYCH: no depression, not anxious  Derm : no itching, no rash         ***** VITAL SIGNS:    T(F): 98.5 (18 @ 03:58), Max: 98.5 (18 @ 03:58)  HR: 88 (18 @ 05:10) (69 - 101)  BP: 133/80 (18 @ 05:10) (100/59 - 149/73)  RR: 18 (18 @ 03:58) (18 - 25)  SpO2: 95% (18 @ 03:58) (90% - 97%)  Wt(kg): --  ,   I&O's Summary    2018 07:  -  2018 07:00  --------------------------------------------------------  IN: 1210 mL / OUT: 1250 mL / NET: -40 mL    2018 07:  -  2018 10:28  --------------------------------------------------------  IN: 240 mL / OUT: 400 mL / NET: -160 mL                   *****PHYSICAL EXAM:  GEN: A&O X 3 , NAD , comfortable  HEENT: NCAT, EOMI, MMM, no icterus  NECK: Supple, No JVD  CVS: S1S2 , regular , No M/R/G appreciated  PULM: CTA B/L,  no W/R/R appreciated  ABD.: soft. non tender, non distended,  bowel sounds present  Extrem: intact pulses , no edema noted  Derm: No rash or ecchymosis noted  PSYCH: normal mood, no depression, not anxious         *****LAB AND IMAGIN.7   10.54 )-----------( 194      ( 2018 08:29 )             40.0               06-30    134<L>  |  94<L>  |  16  ----------------------------<  127<H>  4.1   |  28  |  0.68    Ca    9.0      2018 06:49      PT/INR - ( 2018 09:46 )   PT: 11.8 sec;   INR: 1.04 ratio         PTT - ( 2018 09:46 )  PTT:26.5 sec                       [All pertinent recent Imaging/Reports reviewed]         *****A S S E S S M E N T   A N D   P L A N :  78F with hx of a.fib, htn adm s/p fall.  intertrochanteric fracture of the right femur  s/p IMN  f/u ortho  resume eliquis   continue current meds  PT    __________________________  SAMY Haskins D.O.

## 2018-06-30 NOTE — PHYSICAL THERAPY INITIAL EVALUATION ADULT - ACTIVE RANGE OF MOTION EXAMINATION, REHAB EVAL
"INSTRUCTIONS FOR WEARING THE DEXCOM CONTINUOUS GLUCOSE MONITOR (CGM):      1. After 2-3 hours, will be prompted to input 2 blood sugar readings - take two different samples (from different fingers) one right after the other, and enter in the ENTER BG menu on the .  This is called calibrating the CGM.      2. Take at least 1 blood sugar reading every 12 hours to calibrate the .  Remember that you should NEVER base a decision to correct a high BG or treat a low BG on the CGM reading.  Always check a blood glucose (BG) reading first.     3.  Don't calibrate the  if you see up or down arrows.  It can make subsequent readings inaccurate.  Wait to ENTER BG until it is >70 and < 300.     4.  It's a good idea to calibrate the CGM before you go to bed so you are not awakened in the middle of the night to do this.     5. Record what you eat at meals and snacks with portion eaten. Record amount of carbohydrate grams in the DexCom  when you eat to help track response to food intake. Record exercise type and time. Record medications you take and the time they are taken.     6. When monitor reads that \"Sensor needs to be Changed,\" go into menu function and hit \"STOP\" (NOT shutdown)    5. If battery power is low (will see indicator on monitor), plug the  in to be charged.  It takes about an hour to fully charge.     7. Avoid taking Tylenol while wearing the DexCom, as it can cause inaccurate glucose readings for about 6 hours you take it.     7. Return all equipment and any food/exercise/medication logs to the [facility] first floor on April 4.  Put everything in the envelope provided and give to one of the concierges on the first or third floor.     8. Follow-up appointment for review of sensor reports scheduled.    " except R hip limited due to pain/no Active ROM deficits were identified

## 2018-07-01 LAB
ANION GAP SERPL CALC-SCNC: 11 MMOL/L — SIGNIFICANT CHANGE UP (ref 5–17)
BUN SERPL-MCNC: 24 MG/DL — HIGH (ref 7–23)
CALCIUM SERPL-MCNC: 9.5 MG/DL — SIGNIFICANT CHANGE UP (ref 8.4–10.5)
CHLORIDE SERPL-SCNC: 93 MMOL/L — LOW (ref 96–108)
CO2 SERPL-SCNC: 30 MMOL/L — SIGNIFICANT CHANGE UP (ref 22–31)
CREAT SERPL-MCNC: 0.7 MG/DL — SIGNIFICANT CHANGE UP (ref 0.5–1.3)
GLUCOSE SERPL-MCNC: 106 MG/DL — HIGH (ref 70–99)
HCT VFR BLD CALC: 38.3 % — SIGNIFICANT CHANGE UP (ref 34.5–45)
HGB BLD-MCNC: 13 G/DL — SIGNIFICANT CHANGE UP (ref 11.5–15.5)
MCHC RBC-ENTMCNC: 33.6 PG — SIGNIFICANT CHANGE UP (ref 27–34)
MCHC RBC-ENTMCNC: 33.9 GM/DL — SIGNIFICANT CHANGE UP (ref 32–36)
MCV RBC AUTO: 99 FL — SIGNIFICANT CHANGE UP (ref 80–100)
PLATELET # BLD AUTO: 227 K/UL — SIGNIFICANT CHANGE UP (ref 150–400)
POTASSIUM SERPL-MCNC: 4.7 MMOL/L — SIGNIFICANT CHANGE UP (ref 3.5–5.3)
POTASSIUM SERPL-SCNC: 4.7 MMOL/L — SIGNIFICANT CHANGE UP (ref 3.5–5.3)
RBC # BLD: 3.87 M/UL — SIGNIFICANT CHANGE UP (ref 3.8–5.2)
RBC # FLD: 13.7 % — SIGNIFICANT CHANGE UP (ref 10.3–14.5)
SODIUM SERPL-SCNC: 134 MMOL/L — LOW (ref 135–145)
WBC # BLD: 11.92 K/UL — HIGH (ref 3.8–10.5)
WBC # FLD AUTO: 11.92 K/UL — HIGH (ref 3.8–10.5)

## 2018-07-01 RX ADMIN — OXYCODONE HYDROCHLORIDE 10 MILLIGRAM(S): 5 TABLET ORAL at 23:45

## 2018-07-01 RX ADMIN — POLYETHYLENE GLYCOL 3350 17 GRAM(S): 17 POWDER, FOR SOLUTION ORAL at 13:07

## 2018-07-01 RX ADMIN — OXYCODONE HYDROCHLORIDE 10 MILLIGRAM(S): 5 TABLET ORAL at 02:00

## 2018-07-01 RX ADMIN — OXYCODONE HYDROCHLORIDE 10 MILLIGRAM(S): 5 TABLET ORAL at 09:43

## 2018-07-01 RX ADMIN — SENNA PLUS 2 TABLET(S): 8.6 TABLET ORAL at 09:44

## 2018-07-01 RX ADMIN — OXYCODONE HYDROCHLORIDE 10 MILLIGRAM(S): 5 TABLET ORAL at 22:48

## 2018-07-01 RX ADMIN — Medication 1 TABLET(S): at 05:10

## 2018-07-01 RX ADMIN — APIXABAN 5 MILLIGRAM(S): 2.5 TABLET, FILM COATED ORAL at 05:10

## 2018-07-01 RX ADMIN — OXYCODONE HYDROCHLORIDE 10 MILLIGRAM(S): 5 TABLET ORAL at 01:02

## 2018-07-01 RX ADMIN — ATORVASTATIN CALCIUM 40 MILLIGRAM(S): 80 TABLET, FILM COATED ORAL at 21:58

## 2018-07-01 RX ADMIN — ATENOLOL 25 MILLIGRAM(S): 25 TABLET ORAL at 05:10

## 2018-07-01 RX ADMIN — Medication 1000 UNIT(S): at 13:07

## 2018-07-01 RX ADMIN — Medication 1 TABLET(S): at 13:07

## 2018-07-01 RX ADMIN — AMLODIPINE BESYLATE 5 MILLIGRAM(S): 2.5 TABLET ORAL at 05:10

## 2018-07-01 RX ADMIN — Medication 1 TABLET(S): at 21:58

## 2018-07-01 RX ADMIN — APIXABAN 5 MILLIGRAM(S): 2.5 TABLET, FILM COATED ORAL at 17:13

## 2018-07-01 RX ADMIN — Medication 100 MILLIGRAM(S): at 13:08

## 2018-07-01 RX ADMIN — Medication 667 MILLIGRAM(S): at 21:58

## 2018-07-01 RX ADMIN — Medication 100 MILLIGRAM(S): at 05:10

## 2018-07-01 RX ADMIN — OXYCODONE HYDROCHLORIDE 10 MILLIGRAM(S): 5 TABLET ORAL at 10:13

## 2018-07-01 RX ADMIN — Medication 12.5 MILLIGRAM(S): at 05:10

## 2018-07-01 RX ADMIN — Medication 100 MILLIGRAM(S): at 21:58

## 2018-07-01 NOTE — OCCUPATIONAL THERAPY INITIAL EVALUATION ADULT - RANGE OF MOTION EXAMINATION, LOWER EXTREMITY
Right LE Active Assistive ROM was WFL  (within functional limits)/Left LE Active ROM was WFL (within functional limits)

## 2018-07-01 NOTE — PROGRESS NOTE ADULT - SUBJECTIVE AND OBJECTIVE BOX
- Patient seen and examined.  - In summary, patient is a 78 year old woman who presented with hip fx, a.fib (2018 17:44)  - Today, patient is without complaints.         *****MEDICATIONS:    MEDICATIONS  (STANDING):  amLODIPine   Tablet 5 milliGRAM(s) Oral daily  apixaban 5 milliGRAM(s) Oral every 12 hours  ATENolol  Tablet 25 milliGRAM(s) Oral daily  atorvastatin 40 milliGRAM(s) Oral at bedtime  calcium acetate 667 milliGRAM(s) Oral at bedtime  calcium carbonate 1250 mG + Vitamin D (OsCal 500 + D) 1 Tablet(s) Oral three times a day  cholecalciferol 1000 Unit(s) Oral daily  docusate sodium 100 milliGRAM(s) Oral three times a day  hydrochlorothiazide 12.5 milliGRAM(s) Oral daily  lactated ringers. 1000 milliLiter(s) (50 mL/Hr) IV Continuous <Continuous>  multivitamin 1 Tablet(s) Oral daily  polyethylene glycol 3350 17 Gram(s) Oral daily    MEDICATIONS  (PRN):  acetaminophen   Tablet 650 milliGRAM(s) Oral every 6 hours PRN For Temp greater than 38 C (100.4 F)  acetaminophen   Tablet 650 milliGRAM(s) Oral every 6 hours PRN headache  acetaminophen   Tablet. 650 milliGRAM(s) Oral every 6 hours PRN pain  aluminum hydroxide/magnesium hydroxide/simethicone Suspension 30 milliLiter(s) Oral four times a day PRN Indigestion  benzocaine 15 mG/menthol 3.6 mG Lozenge 1 Lozenge Oral every 6 hours PRN Sore Throat  bisacodyl Suppository 10 milliGRAM(s) Rectal daily PRN If no bowel movement by POD#2  diphenhydrAMINE   Capsule 25 milliGRAM(s) Oral at bedtime PRN Insomnia  HYDROmorphone  Injectable 0.25 milliGRAM(s) IV Push every 10 minutes PRN Moderate Pain (4 - 6)  morphine  - Injectable 2 milliGRAM(s) IV Push every 4 hours PRN breakthrough pain  ondansetron Injectable 4 milliGRAM(s) IV Push every 6 hours PRN Nausea and/or Vomiting  oxyCODONE    IR 5 milliGRAM(s) Oral every 4 hours PRN Moderate Pain (4 - 6)  oxyCODONE    IR 10 milliGRAM(s) Oral every 4 hours PRN Severe Pain (7 - 10)  senna 2 Tablet(s) Oral at bedtime PRN Constipation               ***** REVIEW OF SYSTEM:  GEN: no fever, no chills, no pain  RESP: no SOB, no cough, no sputum  CVS: no chest pain, no palpitations, no edema  GI: no abdominal pain, no nausea, no vomiting, no constipation, no diarrhea  : no dysuria, no frequency  NEURO: no headache, no dizziness  PSYCH: no depression, not anxious  Derm : no itching, no rash         ***** VITAL SIGNS:    T(F): 98.2 (18 @ 04:42), Max: 98.7 (18 @ 21:05)  HR: 77 (18 @ 04:42) (70 - 77)  BP: 105/73 (18 @ 04:42) (105/73 - 115/75)  RR: 18 (18 @ 04:42) (18 - 18)  SpO2: 94% (18 @ 04:42) (93% - 94%)  Wt(kg): --  ,   I&O's Summary    2018 07:  -  2018 07:00  --------------------------------------------------------  IN: 720 mL / OUT: 1200 mL / NET: -480 mL    2018 07:  -  2018 12:23  --------------------------------------------------------  IN: 320 mL / OUT: 400 mL / NET: -80 mL             *****PHYSICAL EXAM:  GEN: A&O X 3 , NAD , comfortable  HEENT: NCAT, EOMI, MMM, no icterus  NECK: Supple, No JVD  CVS: S1S2 , regular , No M/R/G appreciated  PULM: CTA B/L,  no W/R/R appreciated  ABD.: soft. non tender, non distended,  bowel sounds present  Extrem: intact pulses , no edema noted  Derm: No rash or ecchymosis noted  PSYCH: normal mood, no depression, not anxious         *****LAB AND IMAGIN.0   11.92 )-----------( 227      ( 2018 08:25 )             38.3               07-01    134<L>  |  93<L>  |  24<H>  ----------------------------<  106<H>  4.7   |  30  |  0.70    Ca    9.5      2018 05:57      [All pertinent recent Imaging/Reports reviewed]         *****A S S E S S M E N T   A N D   P L A N :  78F with hx of a.fib, htn adm s/p fall.  intertrochanteric fracture of the right femur  s/p IMN  ortho f/u noted  resumed on eliquis   continue current meds  PT  DCP  __________________________  SAMY Haskins D.O.

## 2018-07-01 NOTE — PROGRESS NOTE ADULT - SUBJECTIVE AND OBJECTIVE BOX
Orthopaedic Surgery Progress Note    Patient seen and examined  No acute events overnight  Pain controlled    Vital Signs Last 24 Hrs  T(C): 36.8 (01 Jul 2018 04:42), Max: 37.1 (30 Jun 2018 21:05)  T(F): 98.2 (01 Jul 2018 04:42), Max: 98.7 (30 Jun 2018 21:05)  HR: 77 (01 Jul 2018 04:42) (70 - 79)  BP: 105/73 (01 Jul 2018 04:42) (105/73 - 115/75)  BP(mean): --  RR: 18 (01 Jul 2018 04:42) (18 - 18)  SpO2: 94% (01 Jul 2018 04:42) (93% - 94%)    PE  NAD  RLE:   dressing C/D/I  motor intact GS/TA/EHL  SILT S/S/SP/DP

## 2018-07-01 NOTE — OCCUPATIONAL THERAPY INITIAL EVALUATION ADULT - PLANNED THERAPY INTERVENTIONS, OT EVAL
balance training/neuromuscular re-education/parent/caregiver training.../transfer training/ADL retraining/strengthening
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
## 2018-07-01 NOTE — OCCUPATIONAL THERAPY INITIAL EVALUATION ADULT - ADL RETRAINING, OT EVAL
Goals: Pt will perform LB dressing with setup and supervision within 2 weeks.  Pt will perform toileting with supervision within 2 weeks.

## 2018-07-01 NOTE — PROGRESS NOTE ADULT - SUBJECTIVE AND OBJECTIVE BOX
_________________________________________________________________________________________  ========>>  M E D I C A L   A T T E N D I N G  (consult)   F O L L O W  U P  N O T E  <<=========  -----------------------------------------------------------------------------------------------------    - Patient seen and examined by me approximately thirty minutes ago.  - In summary, patient is a 78y year old woman who originally presented post fall, hip fracture   - Patient overall doing better, comfortable, pain improved, pt spending more breanna in chair     ==================>> REVIEW OF SYSTEM <<=================    GEN: no fever, no chills, no pain a rest   RESP: no SOB, no cough, no sputum  CVS: no chest pain, no palpitations, no edema  GI: no abdominal pain, no nausea, no constipation, no diarrhea  : no dysuria, no frequency, no hematuria  Neuro: no headache, no dizziness  Derm : no itching, no rash    ==================>> PHYSICAL EXAM <<=================    GEN: A&O X 3 , NAD , comfortable in chair   HEENT: NCAT, PERRL, MMM, hearing intact  Neck: supple , no JVD  CVS: S1S2 , regular , No M/R/G appreciated  PULM: CTA B/L,  no W/R/R appreciated  ABD.: soft. non tender, non distended,  bowel sounds present  Extrem: intact pulses , no edema, wound dressed and clean  PSYCH : normal mood,  a bit anxious /nervous        ==================>> MEDICATIONS <<====================    amLODIPine   Tablet 5 milliGRAM(s) Oral daily  apixaban 5 milliGRAM(s) Oral every 12 hours  ATENolol  Tablet 25 milliGRAM(s) Oral daily  atorvastatin 40 milliGRAM(s) Oral at bedtime  calcium acetate 667 milliGRAM(s) Oral at bedtime  calcium carbonate 1250 mG + Vitamin D (OsCal 500 + D) 1 Tablet(s) Oral three times a day  cholecalciferol 1000 Unit(s) Oral daily  docusate sodium 100 milliGRAM(s) Oral three times a day  hydrochlorothiazide 12.5 milliGRAM(s) Oral daily  lactated ringers. 1000 milliLiter(s) IV Continuous <Continuous>  multivitamin 1 Tablet(s) Oral daily  polyethylene glycol 3350 17 Gram(s) Oral daily    MEDICATIONS  (PRN):  acetaminophen   Tablet 650 milliGRAM(s) Oral every 6 hours PRN For Temp greater than 38 C (100.4 F)  acetaminophen   Tablet 650 milliGRAM(s) Oral every 6 hours PRN headache  acetaminophen   Tablet. 650 milliGRAM(s) Oral every 6 hours PRN pain  aluminum hydroxide/magnesium hydroxide/simethicone Suspension 30 milliLiter(s) Oral four times a day PRN Indigestion  benzocaine 15 mG/menthol 3.6 mG Lozenge 1 Lozenge Oral every 6 hours PRN Sore Throat  bisacodyl Suppository 10 milliGRAM(s) Rectal daily PRN If no bowel movement by POD#2  diphenhydrAMINE   Capsule 25 milliGRAM(s) Oral at bedtime PRN Insomnia  HYDROmorphone  Injectable 0.25 milliGRAM(s) IV Push every 10 minutes PRN Moderate Pain (4 - 6)  morphine  - Injectable 2 milliGRAM(s) IV Push every 4 hours PRN breakthrough pain  ondansetron Injectable 4 milliGRAM(s) IV Push every 6 hours PRN Nausea and/or Vomiting  oxyCODONE    IR 5 milliGRAM(s) Oral every 4 hours PRN Moderate Pain (4 - 6)  oxyCODONE    IR 10 milliGRAM(s) Oral every 4 hours PRN Severe Pain (7 - 10)  senna 2 Tablet(s) Oral at bedtime PRN Constipation    ==================>> VITAL SIGNS <<==================    Vital Signs Last 24 Hrs  T(C): 36.7 (07-01-18 @ 13:20)  T(F): 98.1 (07-01-18 @ 13:20), Max: 98.7 (06-30-18 @ 21:05)  HR: 80 (07-01-18 @ 13:20) (70 - 80)  BP: 104/69 (07-01-18 @ 13:20)  BP(mean): --  RR: 18 (07-01-18 @ 13:20) (18 - 18)  SpO2: 91% (07-01-18 @ 13:20) (91% - 94%)       ==================>> LAB AND IMAGING <<==================                        13.0   11.92 )-----------( 227      ( 01 Jul 2018 08:25 )             38.3     WBC count:   11.92 <<== ,  10.54 <<== ,  14.7 <<== ,  11.27 <<== ,  9.3 <<== ,  9.4 <<==     134<L>  |  93<L>  |  24<H>  ----------------------------<  106<H>  4.7   |  30  |  0.70    Sodium:   134  <==, 134  <==, 131  <==, 132  <==, 133  <==, 134  <==    Ca    9.5      01 Jul 2018 05:57    ___________________________________________________________________________________  ===============>>  A S S E S S M E N T   A N D   P L A N <<===============  ------------------------------------------------------------------------------------------    **fall, likely mechanical in nature, with Rt hip fracture  --- post op   ---pain mgmt  --- PT / OOB  ---DVT PPX  -- Eliquis resumed    **Afib, stable  ---back on AC as above   --- rate control  ---monitor vitals closely  ---cardio f/u appreciated     **Arthritis   pt was being worker up and prepared for a knee replacement for severere OA of knee  [place] f.u  Pain mgmt  PT >> rehab    **Hypoxemia, unclear etiology, resolved, pt off O2 now  --- pulm appreciated  -- Incentive spirometer     **Hypertension, HLD  ---Continue with medications as above  ---DASH diet   ---close monitoring of vitals    **hyponatremia, possible SIADH due to pain, stable   -- encourage PO intkae  -- monitor    -GI/DVT Prophylaxis.    --------------------------------------------  Case discussed with pt, sons, cardio  Education given on findings and plan of care.  ___________________________  Will follow with you.  Thank you,  SHAZIA Casas D.O.  Pager: [phone]

## 2018-07-02 ENCOUNTER — TRANSCRIPTION ENCOUNTER (OUTPATIENT)
Age: 78
End: 2018-07-02

## 2018-07-02 VITALS
SYSTOLIC BLOOD PRESSURE: 121 MMHG | HEART RATE: 89 BPM | RESPIRATION RATE: 19 BRPM | TEMPERATURE: 98 F | OXYGEN SATURATION: 92 % | DIASTOLIC BLOOD PRESSURE: 71 MMHG

## 2018-07-02 LAB
ANION GAP SERPL CALC-SCNC: 11 MMOL/L — SIGNIFICANT CHANGE UP (ref 5–17)
BUN SERPL-MCNC: 26 MG/DL — HIGH (ref 7–23)
CALCIUM SERPL-MCNC: 9.4 MG/DL — SIGNIFICANT CHANGE UP (ref 8.4–10.5)
CHLORIDE SERPL-SCNC: 90 MMOL/L — LOW (ref 96–108)
CO2 SERPL-SCNC: 31 MMOL/L — SIGNIFICANT CHANGE UP (ref 22–31)
CREAT SERPL-MCNC: 0.66 MG/DL — SIGNIFICANT CHANGE UP (ref 0.5–1.3)
GLUCOSE SERPL-MCNC: 101 MG/DL — HIGH (ref 70–99)
HCT VFR BLD CALC: 37.4 % — SIGNIFICANT CHANGE UP (ref 34.5–45)
HGB BLD-MCNC: 12.3 G/DL — SIGNIFICANT CHANGE UP (ref 11.5–15.5)
MCHC RBC-ENTMCNC: 32.7 PG — SIGNIFICANT CHANGE UP (ref 27–34)
MCHC RBC-ENTMCNC: 32.9 GM/DL — SIGNIFICANT CHANGE UP (ref 32–36)
MCV RBC AUTO: 99.5 FL — SIGNIFICANT CHANGE UP (ref 80–100)
PLATELET # BLD AUTO: 237 K/UL — SIGNIFICANT CHANGE UP (ref 150–400)
POTASSIUM SERPL-MCNC: 4.8 MMOL/L — SIGNIFICANT CHANGE UP (ref 3.5–5.3)
POTASSIUM SERPL-SCNC: 4.8 MMOL/L — SIGNIFICANT CHANGE UP (ref 3.5–5.3)
RBC # BLD: 3.76 M/UL — LOW (ref 3.8–5.2)
RBC # FLD: 13.6 % — SIGNIFICANT CHANGE UP (ref 10.3–14.5)
SODIUM SERPL-SCNC: 132 MMOL/L — LOW (ref 135–145)
WBC # BLD: 12.26 K/UL — HIGH (ref 3.8–10.5)
WBC # FLD AUTO: 12.26 K/UL — HIGH (ref 3.8–10.5)

## 2018-07-02 PROCEDURE — 93005 ELECTROCARDIOGRAM TRACING: CPT

## 2018-07-02 PROCEDURE — 97165 OT EVAL LOW COMPLEX 30 MIN: CPT

## 2018-07-02 PROCEDURE — 71045 X-RAY EXAM CHEST 1 VIEW: CPT

## 2018-07-02 PROCEDURE — 85610 PROTHROMBIN TIME: CPT

## 2018-07-02 PROCEDURE — 97530 THERAPEUTIC ACTIVITIES: CPT

## 2018-07-02 PROCEDURE — C1713: CPT

## 2018-07-02 PROCEDURE — 73502 X-RAY EXAM HIP UNI 2-3 VIEWS: CPT

## 2018-07-02 PROCEDURE — 96374 THER/PROPH/DIAG INJ IV PUSH: CPT

## 2018-07-02 PROCEDURE — 84443 ASSAY THYROID STIM HORMONE: CPT

## 2018-07-02 PROCEDURE — 86901 BLOOD TYPING SEROLOGIC RH(D): CPT

## 2018-07-02 PROCEDURE — 97116 GAIT TRAINING THERAPY: CPT

## 2018-07-02 PROCEDURE — 82330 ASSAY OF CALCIUM: CPT

## 2018-07-02 PROCEDURE — 73552 X-RAY EXAM OF FEMUR 2/>: CPT

## 2018-07-02 PROCEDURE — 96375 TX/PRO/DX INJ NEW DRUG ADDON: CPT

## 2018-07-02 PROCEDURE — 86850 RBC ANTIBODY SCREEN: CPT

## 2018-07-02 PROCEDURE — 97161 PT EVAL LOW COMPLEX 20 MIN: CPT

## 2018-07-02 PROCEDURE — 82947 ASSAY GLUCOSE BLOOD QUANT: CPT

## 2018-07-02 PROCEDURE — 73551 X-RAY EXAM OF FEMUR 1: CPT

## 2018-07-02 PROCEDURE — 86900 BLOOD TYPING SEROLOGIC ABO: CPT

## 2018-07-02 PROCEDURE — 85014 HEMATOCRIT: CPT

## 2018-07-02 PROCEDURE — 80053 COMPREHEN METABOLIC PANEL: CPT

## 2018-07-02 PROCEDURE — 85730 THROMBOPLASTIN TIME PARTIAL: CPT

## 2018-07-02 PROCEDURE — 84484 ASSAY OF TROPONIN QUANT: CPT

## 2018-07-02 PROCEDURE — 99285 EMERGENCY DEPT VISIT HI MDM: CPT | Mod: 25

## 2018-07-02 PROCEDURE — 80061 LIPID PANEL: CPT

## 2018-07-02 PROCEDURE — 84295 ASSAY OF SERUM SODIUM: CPT

## 2018-07-02 PROCEDURE — 84132 ASSAY OF SERUM POTASSIUM: CPT

## 2018-07-02 PROCEDURE — 80048 BASIC METABOLIC PNL TOTAL CA: CPT

## 2018-07-02 PROCEDURE — C1769: CPT

## 2018-07-02 PROCEDURE — 85027 COMPLETE CBC AUTOMATED: CPT

## 2018-07-02 PROCEDURE — 82435 ASSAY OF BLOOD CHLORIDE: CPT

## 2018-07-02 PROCEDURE — 83605 ASSAY OF LACTIC ACID: CPT

## 2018-07-02 PROCEDURE — 76000 FLUOROSCOPY <1 HR PHYS/QHP: CPT

## 2018-07-02 PROCEDURE — 71275 CT ANGIOGRAPHY CHEST: CPT

## 2018-07-02 PROCEDURE — 82803 BLOOD GASES ANY COMBINATION: CPT

## 2018-07-02 RX ORDER — SENNA PLUS 8.6 MG/1
2 TABLET ORAL
Qty: 0 | Refills: 0 | COMMUNITY
Start: 2018-07-02

## 2018-07-02 RX ORDER — DIPHENHYDRAMINE HCL 50 MG
1 CAPSULE ORAL
Qty: 0 | Refills: 0 | COMMUNITY
Start: 2018-07-02

## 2018-07-02 RX ORDER — ATORVASTATIN CALCIUM 80 MG/1
1 TABLET, FILM COATED ORAL
Qty: 0 | Refills: 0 | COMMUNITY
Start: 2018-07-02

## 2018-07-02 RX ORDER — CALCIUM ACETATE 667 MG
1 TABLET ORAL
Qty: 0 | Refills: 0 | COMMUNITY
Start: 2018-07-02

## 2018-07-02 RX ORDER — OXYCODONE HYDROCHLORIDE 5 MG/1
1 TABLET ORAL
Qty: 0 | Refills: 0 | COMMUNITY
Start: 2018-07-02

## 2018-07-02 RX ORDER — ATENOLOL 25 MG/1
1 TABLET ORAL
Qty: 0 | Refills: 0 | COMMUNITY
Start: 2018-07-02

## 2018-07-02 RX ORDER — POLYETHYLENE GLYCOL 3350 17 G/17G
17 POWDER, FOR SOLUTION ORAL
Qty: 0 | Refills: 0 | COMMUNITY
Start: 2018-07-02

## 2018-07-02 RX ORDER — ACETAMINOPHEN 500 MG
2 TABLET ORAL
Qty: 0 | Refills: 0 | COMMUNITY
Start: 2018-07-02

## 2018-07-02 RX ORDER — CHOLECALCIFEROL (VITAMIN D3) 125 MCG
1000 CAPSULE ORAL
Qty: 0 | Refills: 0 | COMMUNITY
Start: 2018-07-02

## 2018-07-02 RX ORDER — DOCUSATE SODIUM 100 MG
1 CAPSULE ORAL
Qty: 0 | Refills: 0 | COMMUNITY
Start: 2018-07-02

## 2018-07-02 RX ADMIN — AMLODIPINE BESYLATE 5 MILLIGRAM(S): 2.5 TABLET ORAL at 05:46

## 2018-07-02 RX ADMIN — POLYETHYLENE GLYCOL 3350 17 GRAM(S): 17 POWDER, FOR SOLUTION ORAL at 14:01

## 2018-07-02 RX ADMIN — OXYCODONE HYDROCHLORIDE 10 MILLIGRAM(S): 5 TABLET ORAL at 09:35

## 2018-07-02 RX ADMIN — Medication 1000 UNIT(S): at 14:01

## 2018-07-02 RX ADMIN — Medication 12.5 MILLIGRAM(S): at 05:46

## 2018-07-02 RX ADMIN — APIXABAN 5 MILLIGRAM(S): 2.5 TABLET, FILM COATED ORAL at 05:46

## 2018-07-02 RX ADMIN — Medication 1 TABLET(S): at 05:46

## 2018-07-02 RX ADMIN — Medication 1 TABLET(S): at 14:01

## 2018-07-02 RX ADMIN — Medication 100 MILLIGRAM(S): at 14:01

## 2018-07-02 RX ADMIN — Medication 100 MILLIGRAM(S): at 05:46

## 2018-07-02 RX ADMIN — ATENOLOL 25 MILLIGRAM(S): 25 TABLET ORAL at 05:46

## 2018-07-02 RX ADMIN — OXYCODONE HYDROCHLORIDE 10 MILLIGRAM(S): 5 TABLET ORAL at 09:05

## 2018-07-02 NOTE — DISCHARGE NOTE ADULT - HOSPITAL COURSE
to be done 78y old  Female with history of  Afib on Eliquis  bradycardia, HTN, HLD GERD, who presented to the ER s/p fall.  Patient states she was crossing the street this morning and fell. She  complains of severe pain in the right hip and denies any other injury. Also patient was complaining of some shortness of breath. She denies chest pain, palpitation and no history of pulmonary disease.  Patient denies any numbness or paresthesia but  unable to move her right leg due to  severe right hip pain.  On arrival to the ER patient's oxygen saturation was 88% on 2 L nasal cannula oxygen.   Patient was seen by orthopedic team and subsequently underwent s/p R IM nail for hip fx.  Patient hypoxia resolved.   Eliquis was restarted post-op.   Patient will follow up with Dr. Hernández within 1-2 weeks after discharge from hospital 78y old  Female with history of  Afib on Eliquis  bradycardia, HTN, HLD GERD, who presented to the ER s/p fall.  Patient states she was crossing the street this morning and fell. She  complains of severe pain in the right hip and denies any other injury. Also patient was complaining of some shortness of breath. She denies chest pain, palpitation and no history of pulmonary disease.  Patient denies any numbness or paresthesia but  unable to move her right leg due to  severe right hip pain.  On arrival to the ER patient's oxygen saturation was 88% on 2 L nasal cannula oxygen.   Patient was seen by orthopedic team and subsequently underwent s/p R IM nail for hip fx.  Patient hypoxia resolved.   Eliquis was restarted post-op.   Patient will follow up with Dr. Hernández and Raza 	within 1-2 weeks after discharge from hospital

## 2018-07-02 NOTE — DISCHARGE NOTE ADULT - PLAN OF CARE
resolved s/p right  Hip pinning   Pain medication as needed   Patient for GLADYS with weight bearing as tolerated continue Eliquis is used  for non-valvular Atrial Fibrillation to thin the blood  to prevent clots from forming .  Take this medication twice as prescribed by your health care provider.  Take this medication with food to prevent upset stomach.  If you miss a dose call your health care provider or pharmacist right away.  Tell your doctor you use this drug before you have a spinal or epidural procedure  Tell dentists, surgeon, and other doctors that you use this drug.  You may bleed more easily.  Be careful and avoid injury.  Use a soft toothbrush and an electric razor. Low salt diet  Activity as tolerated.  Take all medication as prescribed.  Follow up with your medical doctor for routine blood pressure monitoring at your next visit.  Notify your doctor if you have any of the following symptoms:   Dizziness, Lightheadedness, Blurry vision, Headache, Chest pain, Shortness of breath

## 2018-07-02 NOTE — DISCHARGE NOTE ADULT - ADDITIONAL INSTRUCTIONS
please call to schedule an appointment with your cardiologist within 1-2 weeks after discharge from hospital   please call to schedule an appointment with your orthopedic physician within 1-2 weeks after discharge from hospital

## 2018-07-02 NOTE — PROGRESS NOTE ADULT - ASSESSMENT
O2 requirement likely related to obesity/basilar atelecatsis postop: hypoxemia resolved currently  s/p R IM nail for hip fx    REC    Mobilize  DC planning for GLADYS per  primary team

## 2018-07-02 NOTE — PROGRESS NOTE ADULT - PROVIDER SPECIALTY LIST ADULT
Cardiology
Cardiology
Internal Medicine
Orthopedics
Pulmonology
Pulmonology
Cardiology
Internal Medicine
no

## 2018-07-02 NOTE — PROGRESS NOTE ADULT - SUBJECTIVE AND OBJECTIVE BOX
- Patient seen and examined.  - In summary, patient is a 78 year old woman who presented with hip fx, a.fib (2018 17:44)  - Today, patient is without complaints.         *****MEDICATIONS:    MEDICATIONS  (STANDING):  amLODIPine   Tablet 5 milliGRAM(s) Oral daily  apixaban 5 milliGRAM(s) Oral every 12 hours  ATENolol  Tablet 25 milliGRAM(s) Oral daily  atorvastatin 40 milliGRAM(s) Oral at bedtime  calcium acetate 667 milliGRAM(s) Oral at bedtime  calcium carbonate 1250 mG + Vitamin D (OsCal 500 + D) 1 Tablet(s) Oral three times a day  cholecalciferol 1000 Unit(s) Oral daily  docusate sodium 100 milliGRAM(s) Oral three times a day  hydrochlorothiazide 12.5 milliGRAM(s) Oral daily  multivitamin 1 Tablet(s) Oral daily  polyethylene glycol 3350 17 Gram(s) Oral daily    MEDICATIONS  (PRN):  acetaminophen   Tablet 650 milliGRAM(s) Oral every 6 hours PRN For Temp greater than 38 C (100.4 F)  acetaminophen   Tablet 650 milliGRAM(s) Oral every 6 hours PRN headache  acetaminophen   Tablet. 650 milliGRAM(s) Oral every 6 hours PRN pain  aluminum hydroxide/magnesium hydroxide/simethicone Suspension 30 milliLiter(s) Oral four times a day PRN Indigestion  benzocaine 15 mG/menthol 3.6 mG Lozenge 1 Lozenge Oral every 6 hours PRN Sore Throat  bisacodyl Suppository 10 milliGRAM(s) Rectal daily PRN If no bowel movement by POD#2  diphenhydrAMINE   Capsule 25 milliGRAM(s) Oral at bedtime PRN Insomnia  HYDROmorphone  Injectable 0.25 milliGRAM(s) IV Push every 10 minutes PRN Moderate Pain (4 - 6)  morphine  - Injectable 2 milliGRAM(s) IV Push every 4 hours PRN breakthrough pain  ondansetron Injectable 4 milliGRAM(s) IV Push every 6 hours PRN Nausea and/or Vomiting  oxyCODONE    IR 5 milliGRAM(s) Oral every 4 hours PRN Moderate Pain (4 - 6)  oxyCODONE    IR 10 milliGRAM(s) Oral every 4 hours PRN Severe Pain (7 - 10)  senna 2 Tablet(s) Oral at bedtime PRN Constipation               ***** REVIEW OF SYSTEM:  GEN: no fever, no chills, no pain  RESP: no SOB, no cough, no sputum  CVS: no chest pain, no palpitations, no edema  GI: no abdominal pain, no nausea, no vomiting, no constipation, no diarrhea  : no dysuria, no frequency  NEURO: no headache, no dizziness  PSYCH: no depression, not anxious  Derm : no itching, no rash         ***** VITAL SIGNS:    T(F): 98.5 (18 @ 04:21), Max: 98.5 (18 @ 04:21)  HR: 80 (18 @ 05:42) (67 - 98)  BP: 120/79 (18 @ 05:42) (99/69 - 120/79)  RR: 18 (18 @ 04:21) (18 - 18)  SpO2: 95% (18 @ 04:21) (90% - 95%)  Wt(kg): --  ,   I&O's Summary    2018 07:01  -  2018 07:00  --------------------------------------------------------  IN: 620 mL / OUT: 1200 mL / NET: -580 mL    2018 07:  -  2018 10:16  --------------------------------------------------------  IN: 240 mL / OUT: 0 mL / NET: 240 mL                   *****PHYSICAL EXAM:  GEN: A&O X 3 , NAD , comfortable  HEENT: NCAT, EOMI, MMM, no icterus  NECK: Supple, No JVD  CVS: S1S2 , regular , No M/R/G appreciated  PULM: CTA B/L,  no W/R/R appreciated  ABD.: soft. non tender, non distended,  bowel sounds present  Extrem: intact pulses , no edema noted  Derm: No rash or ecchymosis noted  PSYCH: normal mood, no depression, not anxious         *****LAB AND IMAGIN.3   12.26 )-----------( 237      ( 2018 08:06 )             37.4               07-02    132<L>  |  90<L>  |  26<H>  ----------------------------<  101<H>  4.8   |  31  |  0.66    Ca    9.4      2018 05:54    [All pertinent recent Imaging/Reports reviewed]         *****A S S E S S M E N T   A N D   P L A N :  78F with hx of a.fib, htn adm s/p fall.  intertrochanteric fracture of the right femur  s/p IMN  ortho f/u noted  resumed on eliquis   continue current meds  PT  DCP  __________________________  SAMY Haskins D.O.

## 2018-07-02 NOTE — PROGRESS NOTE ADULT - SUBJECTIVE AND OBJECTIVE BOX
Ortho Progress Note    S: Patient seen and examined. No acute events overnight. Pain well controlled with current regimen. Denies lightheadedness/dizziness, CP/SOB. Tolerating diet.       O:  Physical Exam:  Gen: Laying in bed, NAD, alert and oriented.   Resp: Unlabored breathing  Ext: EHL/FHL/TA/Sol intact          + SILT DP/SP/HUBER/Sa          + extremity WWP    Vital Signs Last 24 Hrs  T(C): 36.9 (02 Jul 2018 04:21), Max: 36.9 (02 Jul 2018 04:21)  T(F): 98.5 (02 Jul 2018 04:21), Max: 98.5 (02 Jul 2018 04:21)  HR: 80 (02 Jul 2018 05:42) (67 - 98)  BP: 120/79 (02 Jul 2018 05:42) (99/69 - 120/79)  BP(mean): --  RR: 18 (02 Jul 2018 04:21) (18 - 18)  SpO2: 95% (02 Jul 2018 04:21) (90% - 95%)                          13.0   11.92 )-----------( 227      ( 01 Jul 2018 08:25 )             38.3                         13.7   10.54 )-----------( 194      ( 30 Jun 2018 08:29 )             40.0       07-02    132<L>  |  90<L>  |  26<H>  ----------------------------<  101<H>  4.8   |  31  |  0.66 Ortho Progress Note    S: Patient seen and examined. No acute events overnight. Pain well controlled with current regimen. Denies lightheadedness/dizziness, CP/SOB. Tolerating diet.       O:  Physical Exam:  Gen: Laying in bed, NAD, alert and oriented.   Resp: Unlabored breathing  Ext: EHL/FHL/TA/Sol intact          + SILT DP/SP/HUBER/Sa          + extremity WWP  Incision: c/d/i, no erythema or edema      Vital Signs Last 24 Hrs  T(C): 36.9 (02 Jul 2018 04:21), Max: 36.9 (02 Jul 2018 04:21)  T(F): 98.5 (02 Jul 2018 04:21), Max: 98.5 (02 Jul 2018 04:21)  HR: 80 (02 Jul 2018 05:42) (67 - 98)  BP: 120/79 (02 Jul 2018 05:42) (99/69 - 120/79)  BP(mean): --  RR: 18 (02 Jul 2018 04:21) (18 - 18)  SpO2: 95% (02 Jul 2018 04:21) (90% - 95%)                          13.0   11.92 )-----------( 227      ( 01 Jul 2018 08:25 )             38.3                         13.7   10.54 )-----------( 194      ( 30 Jun 2018 08:29 )             40.0       07-02    132<L>  |  90<L>  |  26<H>  ----------------------------<  101<H>  4.8   |  31  |  0.66

## 2018-07-02 NOTE — PROGRESS NOTE ADULT - ASSESSMENT
78F sp R IMN    ·	Neuro: Pain control  ·	Resp: IS  ·	GI: Regular diet, bowel reg  ·	MSK: WBAT, PT/OT  ·	Heme: DVT PPX  ·	No further ortho intervention  ·	ortho to sign off  ·	please reconsult as needed    Ortho [id number]

## 2018-07-02 NOTE — PROGRESS NOTE ADULT - SUBJECTIVE AND OBJECTIVE BOX
Follow-up Pulm Progress Note  Wei Pool MD  [phone]    No new respiratory events overnight.  Denies SOB/CP.   Afebrile and hemodynamically stable  OOB, breathing cmofrtably on RA: sat 90%      Vital Signs Last 24 Hrs  T(C): 37 (02 Jul 2018 11:25), Max: 37 (02 Jul 2018 11:25)  T(F): 98.6 (02 Jul 2018 11:25), Max: 98.6 (02 Jul 2018 11:25)  HR: 78 (02 Jul 2018 11:25) (67 - 98)  BP: 90/59 (02 Jul 2018 11:25) (90/59 - 122/82)  BP(mean): --  RR: 18 (02 Jul 2018 11:25) (18 - 18)  SpO2: 90% (02 Jul 2018 11:25) (90% - 95%)                         12.3   12.26 )-----------( 237      ( 02 Jul 2018 08:06 )             37.4       07-02    132<L>  |  90<L>  |  26<H>  ----------------------------<  101<H>  4.8   |  31  |  0.66    Ca    9.4      02 Jul 2018 05:54          PT/INR - ( 29 Jun 2018 09:46 )   PT: 11.8 sec;   INR: 1.04 ratio         PTT - ( 29 Jun 2018 09:46 )  PTT:26.5 sec    Physical Examination:  PULM: Clear without wheeze: few basilar crackles  CVS: Regular rate and rhythm, no murmurs, rubs, or gallops  ABD: Soft, non-tender  EXT:  No clubbing, cyanosis, or edema    RADIOLOGY REVIEWED  CXR:    CT chest:    TTE:

## 2018-07-02 NOTE — DISCHARGE NOTE ADULT - CARE PROVIDER_API CALL
Kell Person (), Cardiology Medicine  [address]  [address]  [place] [address]  Phone: [phone]  Fax: [phone]    Segundo Hernández), Orthopaedic Surgery  [address]  [address]  [place] [address]  Phone: [phone]  Fax: [phone]

## 2018-07-02 NOTE — PROGRESS NOTE ADULT - SUBJECTIVE AND OBJECTIVE BOX
_________________________________________________________________________________________  ========>>  M E D I C A L   A T T E N D I N G  (consult)   F O L L O W  U P  N O T E  <<=========  -----------------------------------------------------------------------------------------------------    - Patient seen and examined by me approximately thirty minutes ago.  - In summary, patient is a 78y year old woman who originally presented post fall, hip fracture   - Patient overall doing better, comfortable, pain on and off    ==================>> REVIEW OF SYSTEM <<=================    GEN: no fever, no chills, no pain a rest   RESP: no SOB, no cough, no sputum  CVS: no chest pain, no palpitations, no edema  GI: no abdominal pain, no nausea, no constipation, no diarrhea  : no dysuria, no frequency, no hematuria  Neuro: no headache, no dizziness  Derm : no itching, no rash    ==================>> PHYSICAL EXAM <<=================    GEN: A&O X 3 , NAD , comfortable in chair   HEENT: NCAT, PERRL, MMM, hearing intact  Neck: supple , no JVD  CVS: S1S2 , regular , No M/R/G appreciated  PULM: CTA B/L,  no W/R/R appreciated  ABD.: soft. non tender, non distended,  bowel sounds present  Extrem: intact pulses , no edema, wound dressed and clean  PSYCH : normal mood,  a bit anxious       ==================>> MEDICATIONS <<====================    amLODIPine   Tablet 5 milliGRAM(s) Oral daily  apixaban 5 milliGRAM(s) Oral every 12 hours  ATENolol  Tablet 25 milliGRAM(s) Oral daily  atorvastatin 40 milliGRAM(s) Oral at bedtime  calcium acetate 667 milliGRAM(s) Oral at bedtime  calcium carbonate 1250 mG + Vitamin D (OsCal 500 + D) 1 Tablet(s) Oral three times a day  cholecalciferol 1000 Unit(s) Oral daily  docusate sodium 100 milliGRAM(s) Oral three times a day  hydrochlorothiazide 12.5 milliGRAM(s) Oral daily  multivitamin 1 Tablet(s) Oral daily  polyethylene glycol 3350 17 Gram(s) Oral daily    MEDICATIONS  (PRN):  acetaminophen   Tablet 650 milliGRAM(s) Oral every 6 hours PRN For Temp greater than 38 C (100.4 F)  acetaminophen   Tablet 650 milliGRAM(s) Oral every 6 hours PRN headache  acetaminophen   Tablet. 650 milliGRAM(s) Oral every 6 hours PRN pain  aluminum hydroxide/magnesium hydroxide/simethicone Suspension 30 milliLiter(s) Oral four times a day PRN Indigestion  benzocaine 15 mG/menthol 3.6 mG Lozenge 1 Lozenge Oral every 6 hours PRN Sore Throat  bisacodyl Suppository 10 milliGRAM(s) Rectal daily PRN If no bowel movement by POD#2  diphenhydrAMINE   Capsule 25 milliGRAM(s) Oral at bedtime PRN Insomnia  HYDROmorphone  Injectable 0.25 milliGRAM(s) IV Push every 10 minutes PRN Moderate Pain (4 - 6)  morphine  - Injectable 2 milliGRAM(s) IV Push every 4 hours PRN breakthrough pain  ondansetron Injectable 4 milliGRAM(s) IV Push every 6 hours PRN Nausea and/or Vomiting  oxyCODONE    IR 5 milliGRAM(s) Oral every 4 hours PRN Moderate Pain (4 - 6)  oxyCODONE    IR 10 milliGRAM(s) Oral every 4 hours PRN Severe Pain (7 - 10)  senna 2 Tablet(s) Oral at bedtime PRN Constipation    ==================>> VITAL SIGNS <<==================    Vital Signs Last 24 Hrs  T(C): 36.9 (07-02-18 @ 04:21)  T(F): 98.5 (07-02-18 @ 04:21), Max: 98.5 (07-02-18 @ 04:21)  HR: 80 (07-02-18 @ 05:42) (67 - 98)  BP: 120/79 (07-02-18 @ 05:42)  BP(mean): --  RR: 18 (07-02-18 @ 04:21) (18 - 18)  SpO2: 95% (07-02-18 @ 04:21) (90% - 95%)       ==================>> LAB AND IMAGING <<==================                        13.0   11.92 )-----------( 227      ( 01 Jul 2018 08:25 )             38.3        07-02    132<L>  |  90<L>  |  26<H>  ----------------------------<  101<H>  4.8   |  31  |  0.66    Sodium:   132  <==, 134  <==, 134  <==, 131  <==, 132  <==, 133  <==, 134  <==    Ca    9.4      02 Jul 2018 05:54    __________________________________________________________________________________  ===============>>  A S S E S S M E N T   A N D   P L A N <<===============  ------------------------------------------------------------------------------------------    **fall, likely mechanical in nature, with Rt hip fracture  --- post op   ---pain mgmt:: encouraged pt to request pain meds as needed   --- PT / OOB  --- rehab planing     **Afib, stable  ---back on AC as above   --- rate control  ---monitor vitals closely  ---cardio f/u appreciated     **Arthritis   pt was being worker up and prepared for a knee replacement for severere OA of knee  [place] f.u as outpatiane ( was planing to have knee replacement before)   Pain mgmt  PT >> rehab    **Hypoxemia, unclear etiology, resolved       pt was off off O2 but back on now!! >> wean off as able   --- pulm appreciated  -- Incentive spirometer encouraged     **Hypertension, HLD  ---Continue with medications as above  ---DASH diet   ---close monitoring of vitals    **hyponatremia, possible SIADH due to pain, stable   -- encourage PO intkae  -- monitor    -GI/DVT Prophylaxis.    --------------------------------------------  Case discussed with pt  Education given on findings and plan of care. IS  ___________________________  Will follow with you.  Thank you,  SHAZIA Casas D.O.  Pager: [phone]

## 2018-07-02 NOTE — DISCHARGE NOTE ADULT - PATIENT PORTAL LINK FT
You can access the [facility] Patient Portal, offered by [facility], by registering with the following website: [email or web address]

## 2018-07-02 NOTE — DISCHARGE NOTE ADULT - CARE PLAN
Principal Discharge DX:	Intertrochanteric fracture of right femur, closed, initial encounter  Goal:	resolved  Assessment and plan of treatment:	s/p right  Hip pinning   Pain medication as needed   Patient for GLADYS with weight bearing as tolerated  Secondary Diagnosis:	Atrial fibrillation, unspecified type  Assessment and plan of treatment:	continue Eliquis is used  for non-valvular Atrial Fibrillation to thin the blood  to prevent clots from forming .  Take this medication twice as prescribed by your health care provider.  Take this medication with food to prevent upset stomach.  If you miss a dose call your health care provider or pharmacist right away.  Tell your doctor you use this drug before you have a spinal or epidural procedure  Tell dentists, surgeon, and other doctors that you use this drug.  You may bleed more easily.  Be careful and avoid injury.  Use a soft toothbrush and an electric razor.  Secondary Diagnosis:	Essential hypertension  Assessment and plan of treatment:	Low salt diet  Activity as tolerated.  Take all medication as prescribed.  Follow up with your medical doctor for routine blood pressure monitoring at your next visit.  Notify your doctor if you have any of the following symptoms:   Dizziness, Lightheadedness, Blurry vision, Headache, Chest pain, Shortness of breath

## 2018-07-02 NOTE — DISCHARGE NOTE ADULT - MEDICATION SUMMARY - MEDICATIONS TO TAKE
I will START or STAY ON the medications listed below when I get home from the hospital:    acetaminophen 325 mg oral tablet  -- 2 tab(s) by mouth every 6 hours, As needed, headache  -- Indication: For pain medication     oxyCODONE 5 mg oral tablet  -- 1 tab(s) by mouth every 4 hours, As needed, Moderate Pain (4 - 6)  -- Indication: For pain medicatrion moderat pain     oxyCODONE 10 mg oral tablet  -- 1 tab(s) by mouth every 4 hours, As needed, Severe Pain (7 - 10)  -- Indication: For Severe pain medicaton     acetaminophen 325 mg oral tablet  -- 2 tab(s) by mouth every 6 hours, As needed, For Temp greater than 38 C (100.4 F)  -- Indication: For Temperature     aluminum hydroxide-magnesium hydroxide 200 mg-200 mg/5 mL oral suspension  -- 30 milliliter(s) by mouth 4 times a day, As needed, Indigestion  -- Indication: For antacid/ GI upset    apixaban 5 mg oral tablet  -- 1 tab(s) by mouth every 12 hours  -- Indication: For blood thinner for Afib     diphenhydrAMINE 25 mg oral capsule  -- 1 cap(s) by mouth once a day (at bedtime), As needed, Insomnia  -- Indication: For Insommina     atorvastatin 40 mg oral tablet  -- 1 tab(s) by mouth once a day (at bedtime)  -- Indication: For elevated cholesterol     atenolol 25 mg oral tablet  -- 1 tab(s) by mouth once a day  -- Indication: For blood pressure     amLODIPine 5 mg oral tablet  -- 1 tab(s) by mouth once a day  -- Indication: For blood pressure     hydroCHLOROthiazide 12.5 mg oral capsule  -- 1 cap(s) by mouth once a day  -- Indication: For water pill/ blood pressure     senna oral tablet  -- 2 tab(s) by mouth once a day (at bedtime), As needed, Constipation  -- Indication: For constipation     docusate sodium 100 mg oral capsule  -- 1 cap(s) by mouth 3 times a day  -- Indication: For Stool softner     polyethylene glycol 3350 oral powder for reconstitution  -- 17 gram(s) by mouth once a day  -- Indication: For constipation     bisacodyl 10 mg rectal suppository  -- 1 suppository(ies) rectally once a day, As needed, If no bowel movement by POD#2  -- Indication: For constipation     calcium acetate 667 mg oral tablet  -- 1 tab(s) by mouth once a day (at bedtime)  -- Indication: For phoshate binder     Multiple Vitamins oral tablet  -- 1 tab(s) by mouth once a day  -- Indication: For vitamin supplement     calcium-vitamin D 500 mg-200 intl units oral tablet  -- 1 tab(s) by mouth 3 times a day  -- Indication: For vitamin supplement    cholecalciferol oral tablet  -- 1000 unit(s) by mouth once a day  -- Indication: For vitamin D supplement

## 2018-08-28 ENCOUNTER — APPOINTMENT (OUTPATIENT)
Dept: ORTHOPEDIC SURGERY | Facility: CLINIC | Age: 78
End: 2018-08-28

## 2019-01-02 NOTE — PATIENT PROFILE ADULT. - NS PRO MODE OF ARRIVAL
Instructions (Optional): Patient to arrive 30 minutes prior for numbing cream
Detail Level: Zone
stretcher

## 2019-01-31 ENCOUNTER — INPATIENT (INPATIENT)
Facility: HOSPITAL | Age: 79
LOS: 1 days | Discharge: HOME | DRG: 287 | End: 2019-02-02
Attending: INTERNAL MEDICINE | Admitting: INTERNAL MEDICINE
Payer: MEDICARE

## 2019-01-31 VITALS
DIASTOLIC BLOOD PRESSURE: 105 MMHG | HEIGHT: 57 IN | OXYGEN SATURATION: 93 % | HEART RATE: 93 BPM | SYSTOLIC BLOOD PRESSURE: 163 MMHG | TEMPERATURE: 97 F | WEIGHT: 136.03 LBS | RESPIRATION RATE: 18 BRPM

## 2019-01-31 DIAGNOSIS — E78.00 PURE HYPERCHOLESTEROLEMIA, UNSPECIFIED: ICD-10-CM

## 2019-01-31 DIAGNOSIS — I10 ESSENTIAL (PRIMARY) HYPERTENSION: ICD-10-CM

## 2019-01-31 DIAGNOSIS — R07.9 CHEST PAIN, UNSPECIFIED: ICD-10-CM

## 2019-01-31 DIAGNOSIS — M25.50 PAIN IN UNSPECIFIED JOINT: ICD-10-CM

## 2019-01-31 LAB
ALBUMIN SERPL ELPH-MCNC: 4.4 G/DL — SIGNIFICANT CHANGE UP (ref 3.3–5)
ALP SERPL-CCNC: 68 U/L — SIGNIFICANT CHANGE UP (ref 40–120)
ALT FLD-CCNC: 17 U/L — SIGNIFICANT CHANGE UP (ref 10–45)
ANION GAP SERPL CALC-SCNC: 16 MMOL/L — SIGNIFICANT CHANGE UP (ref 5–17)
APTT BLD: 38.6 SEC — HIGH (ref 27.5–36.3)
AST SERPL-CCNC: 24 U/L — SIGNIFICANT CHANGE UP (ref 10–40)
BASE EXCESS BLDV CALC-SCNC: 4.6 MMOL/L — HIGH (ref -2–2)
BILIRUB SERPL-MCNC: 0.5 MG/DL — SIGNIFICANT CHANGE UP (ref 0.2–1.2)
BUN SERPL-MCNC: 20 MG/DL — SIGNIFICANT CHANGE UP (ref 7–23)
CA-I SERPL-SCNC: 1.15 MMOL/L — SIGNIFICANT CHANGE UP (ref 1.12–1.3)
CALCIUM SERPL-MCNC: 9.1 MG/DL — SIGNIFICANT CHANGE UP (ref 8.4–10.5)
CHLORIDE BLDV-SCNC: 101 MMOL/L — SIGNIFICANT CHANGE UP (ref 96–108)
CHLORIDE SERPL-SCNC: 97 MMOL/L — SIGNIFICANT CHANGE UP (ref 96–108)
CO2 BLDV-SCNC: 34 MMOL/L — HIGH (ref 22–30)
CO2 SERPL-SCNC: 24 MMOL/L — SIGNIFICANT CHANGE UP (ref 22–31)
CREAT SERPL-MCNC: 0.64 MG/DL — SIGNIFICANT CHANGE UP (ref 0.5–1.3)
GAS PNL BLDV: 133 MMOL/L — LOW (ref 136–145)
GAS PNL BLDV: SIGNIFICANT CHANGE UP
GLUCOSE BLDV-MCNC: 93 MG/DL — SIGNIFICANT CHANGE UP (ref 70–99)
GLUCOSE SERPL-MCNC: 96 MG/DL — SIGNIFICANT CHANGE UP (ref 70–99)
HCO3 BLDV-SCNC: 32 MMOL/L — HIGH (ref 21–29)
HCT VFR BLD CALC: 47.6 % — HIGH (ref 34.5–45)
HCT VFR BLDA CALC: 50 % — SIGNIFICANT CHANGE UP (ref 39–50)
HGB BLD CALC-MCNC: 16.4 G/DL — HIGH (ref 11.5–15.5)
HGB BLD-MCNC: 16.5 G/DL — HIGH (ref 11.5–15.5)
INR BLD: 1.29 RATIO — HIGH (ref 0.88–1.16)
LACTATE BLDV-MCNC: 1.1 MMOL/L — SIGNIFICANT CHANGE UP (ref 0.7–2)
LIDOCAIN IGE QN: 37 U/L — SIGNIFICANT CHANGE UP (ref 7–60)
MAGNESIUM SERPL-MCNC: 1.6 MG/DL — SIGNIFICANT CHANGE UP (ref 1.6–2.6)
MCHC RBC-ENTMCNC: 34 PG — SIGNIFICANT CHANGE UP (ref 27–34)
MCHC RBC-ENTMCNC: 34.7 GM/DL — SIGNIFICANT CHANGE UP (ref 32–36)
MCV RBC AUTO: 97.9 FL — SIGNIFICANT CHANGE UP (ref 80–100)
NT-PROBNP SERPL-SCNC: 825 PG/ML — HIGH (ref 0–300)
PCO2 BLDV: 60 MMHG — HIGH (ref 35–50)
PH BLDV: 7.35 — SIGNIFICANT CHANGE UP (ref 7.35–7.45)
PLATELET # BLD AUTO: 276 K/UL — SIGNIFICANT CHANGE UP (ref 150–400)
PO2 BLDV: 26 MMHG — SIGNIFICANT CHANGE UP (ref 25–45)
POTASSIUM BLDV-SCNC: 3.5 MMOL/L — SIGNIFICANT CHANGE UP (ref 3.5–5.3)
POTASSIUM SERPL-MCNC: 3.6 MMOL/L — SIGNIFICANT CHANGE UP (ref 3.5–5.3)
POTASSIUM SERPL-SCNC: 3.6 MMOL/L — SIGNIFICANT CHANGE UP (ref 3.5–5.3)
PROT SERPL-MCNC: 8.1 G/DL — SIGNIFICANT CHANGE UP (ref 6–8.3)
PROTHROM AB SERPL-ACNC: 14.8 SEC — HIGH (ref 10–12.9)
RBC # BLD: 4.86 M/UL — SIGNIFICANT CHANGE UP (ref 3.8–5.2)
RBC # FLD: 12.4 % — SIGNIFICANT CHANGE UP (ref 10.3–14.5)
SAO2 % BLDV: 38 % — LOW (ref 67–88)
SODIUM SERPL-SCNC: 137 MMOL/L — SIGNIFICANT CHANGE UP (ref 135–145)
TROPONIN T, HIGH SENSITIVITY RESULT: 16 NG/L — SIGNIFICANT CHANGE UP (ref 0–51)
TROPONIN T, HIGH SENSITIVITY RESULT: 9 NG/L — SIGNIFICANT CHANGE UP (ref 0–51)
WBC # BLD: 11.2 K/UL — HIGH (ref 3.8–10.5)
WBC # FLD AUTO: 11.2 K/UL — HIGH (ref 3.8–10.5)

## 2019-01-31 PROCEDURE — 99285 EMERGENCY DEPT VISIT HI MDM: CPT | Mod: 25

## 2019-01-31 PROCEDURE — 93010 ELECTROCARDIOGRAM REPORT: CPT

## 2019-01-31 PROCEDURE — 71046 X-RAY EXAM CHEST 2 VIEWS: CPT | Mod: 26

## 2019-01-31 RX ORDER — ACETAMINOPHEN 500 MG
650 TABLET ORAL EVERY 6 HOURS
Qty: 0 | Refills: 0 | Status: DISCONTINUED | OUTPATIENT
Start: 2019-01-31 | End: 2019-02-02

## 2019-01-31 RX ORDER — ASPIRIN/CALCIUM CARB/MAGNESIUM 324 MG
324 TABLET ORAL ONCE
Qty: 0 | Refills: 0 | Status: COMPLETED | OUTPATIENT
Start: 2019-01-31 | End: 2019-01-31

## 2019-01-31 RX ORDER — ATORVASTATIN CALCIUM 80 MG/1
40 TABLET, FILM COATED ORAL AT BEDTIME
Qty: 0 | Refills: 0 | Status: DISCONTINUED | OUTPATIENT
Start: 2019-01-31 | End: 2019-02-02

## 2019-01-31 RX ORDER — HYDROCHLOROTHIAZIDE 25 MG
12.5 TABLET ORAL DAILY
Qty: 0 | Refills: 0 | Status: DISCONTINUED | OUTPATIENT
Start: 2019-01-31 | End: 2019-02-02

## 2019-01-31 RX ORDER — CALCIUM ACETATE 667 MG
667 TABLET ORAL
Qty: 0 | Refills: 0 | Status: DISCONTINUED | OUTPATIENT
Start: 2019-01-31 | End: 2019-02-02

## 2019-01-31 RX ORDER — ASPIRIN/CALCIUM CARB/MAGNESIUM 324 MG
81 TABLET ORAL DAILY
Qty: 0 | Refills: 0 | Status: DISCONTINUED | OUTPATIENT
Start: 2019-01-31 | End: 2019-02-02

## 2019-01-31 RX ORDER — LOSARTAN POTASSIUM 100 MG/1
100 TABLET, FILM COATED ORAL DAILY
Qty: 0 | Refills: 0 | Status: DISCONTINUED | OUTPATIENT
Start: 2019-01-31 | End: 2019-02-02

## 2019-01-31 RX ORDER — AMLODIPINE BESYLATE 2.5 MG/1
5 TABLET ORAL DAILY
Qty: 0 | Refills: 0 | Status: DISCONTINUED | OUTPATIENT
Start: 2019-01-31 | End: 2019-02-02

## 2019-01-31 RX ORDER — ATENOLOL 25 MG/1
25 TABLET ORAL DAILY
Qty: 0 | Refills: 0 | Status: DISCONTINUED | OUTPATIENT
Start: 2019-01-31 | End: 2019-02-02

## 2019-01-31 RX ADMIN — Medication 324 MILLIGRAM(S): at 14:11

## 2019-01-31 NOTE — H&P ADULT - ASSESSMENT
pt with hx of chronic a.fib on ac with sig cardiac ecg abnormality with chest painm pain free now.  asa daily  hold elliquis for cath tomorrow  lipid  cardiac enzymes  tele  cath in am

## 2019-01-31 NOTE — ED ADULT NURSE NOTE - OBJECTIVE STATEMENT
79 yr old female with cardiac history came in with chest pain for the past few days with a change in her ekg. on assessment a and o x 3 lungs clear abd soft non tender no swelling in extremities no n/v/d no fevers no other complaints. vitals stable no other complaints.

## 2019-01-31 NOTE — H&P ADULT - NSHPPOAURINARYCATHETER_GEN_ALL_CORE
no

## 2019-01-31 NOTE — ED PROVIDER NOTE - ATTENDING CONTRIBUTION TO CARE
80y/o F with h/o HLD, HTN, presenting with episode of chest pain last night; saw her cardiologist today and found to have new biphasic t wave in V2; unchanged lateral t wave inversions; no CP at present.    On Physical Exam:  General: well appearing, in NAD, speaking clearly in full sentences and without difficulty; cooperative with exam  HEENT: PERRL, MMM  Neck: no neck tenderness, no nuchal rigidity  Cardiac: normal s1, s2; RRR; no MGR  Lungs: CTABL  Abdomen: soft nontender/nondistended  : no bladder tenderness or distension  Skin: intact, no rash  Extremities: no peripheral edema, no gross deformities  Neuro: no gross neurologic deficits     AP: Chest pain, ECG changes concerning for possible Wellen's sign, plan for admission for further cardiac evaluation.

## 2019-01-31 NOTE — PROGRESS NOTE ADULT - SUBJECTIVE AND OBJECTIVE BOX
MATTHIASDEFRANCIS JEWELTIARRA  79y  Female      Patient is a 79y old  Female who presents with a chief complaint of chest pain (31 Jan 2019 16:31)      INTERVAL HPI/OVERNIGHT EVENTS:        REVIEW OF SYSTEMS:  CONSTITUTIONAL: No fever, weight loss, + fatigue  EYES: No eye pain, visual disturbances, or discharge  ENMT:   No sinus or throat pain  NECK: No pain or stiffness  BREASTS: No pain, masses, or nipple discharge  RESPIRATORY: No cough, wheezing, chills or hemoptysis; No shortness of breath  CARDIOVASCULAR: + chest pain, palpitations, dizziness, or leg swelling  GASTROINTESTINAL: No abdominal or epigastric pain. No nausea, vomiting, or hematemesis; No diarrhea or constipation. No melena or hematochezia.  GENITOURINARY: No hematuria  NEUROLOGICAL: demented    SKIN: No itching, burning, rashes, or lesions   LYMPH NODES: No enlarged glands  ENDOCRINE: No heat or cold intolerance; No hair loss  MUSCULOSKELETAL: + Lt shoulder pain    HEME/LYMPH: No easy bruising, or bleeding gums  ALLERY AND IMMUNOLOGIC: No hives or eczema    T(C): 36.7 (01-31-19 @ 19:10), Max: 36.7 (01-31-19 @ 15:24)  HR: 78 (01-31-19 @ 19:10) (78 - 93)  BP: 138/70 (01-31-19 @ 19:10) (138/70 - 163/105)  RR: 18 (01-31-19 @ 19:10) (17 - 18)  SpO2: 98% (01-31-19 @ 19:10) (93% - 98%)  Wt(kg): --Vital Signs Last 24 Hrs  T(C): 36.7 (31 Jan 2019 19:10), Max: 36.7 (31 Jan 2019 15:24)  T(F): 98 (31 Jan 2019 19:10), Max: 98 (31 Jan 2019 15:24)  HR: 78 (31 Jan 2019 19:10) (78 - 93)  BP: 138/70 (31 Jan 2019 19:10) (138/70 - 163/105)  BP(mean): --  RR: 18 (31 Jan 2019 19:10) (17 - 18)  SpO2: 98% (31 Jan 2019 19:10) (93% - 98%)    PHYSICAL EXAM:  GENERAL: NAD, well-groomed, well-developed  HEAD:  Atraumatic, Normocephalic  EYES: EOMI, PERRLA, conjunctiva and sclera clear  ENMT: No tonsillar erythema, exudates, or enlargement; Moist mucous membranes, Good dentition, No lesions  NECK: Supple, No JVD, Normal thyroid  NERVOUS SYSTEM:  demented.no focal neurodeficit.  CHEST/LUNG: Clear to percussion bilaterally; No rales, rhonchi, wheezing, or rubs  HEART: Regular rate and rhythm; No murmurs, rubs, or gallops  ABDOMEN: Soft, Nontender, Nondistended; Bowel sounds present  EXTREMITIES:  2+ Peripheral Pulses, No clubbing, cyanosis.  LYMPH: No lymphadenopathy noted  SKIN: No rashes or lesions    Consultant(s) Notes Reviewed:  [x ] YES  [ ] NO  Care Discussed with Consultants/Other Providers [ x] YES  [ ] NO    LABS:                        16.5   11.2  )-----------( 276      ( 31 Jan 2019 13:22 )             47.6     01-31    137  |  97  |  20  ----------------------------<  96  3.6   |  24  |  0.64    Ca    9.1      31 Jan 2019 13:22  Mg     1.6     01-31    TPro  8.1  /  Alb  4.4  /  TBili  0.5  /  DBili  x   /  AST  24  /  ALT  17  /  AlkPhos  68  01-31    PT/INR - ( 31 Jan 2019 13:22 )   PT: 14.8 sec;   INR: 1.29 ratio         PTT - ( 31 Jan 2019 13:22 )  PTT:38.6 sec    CAPILLARY BLOOD GLUCOSE                RADIOLOGY & ADDITIONAL TESTS:    Imaging Personally Reviewed:  [ ] YES  [ ] NO

## 2019-01-31 NOTE — ED ADULT NURSE NOTE - NSIMPLEMENTINTERV_GEN_ALL_ED
Implemented All Universal Safety Interventions:  Zap to call system. Call bell, personal items and telephone within reach. Instruct patient to call for assistance. Room bathroom lighting operational. Non-slip footwear when patient is off stretcher. Physically safe environment: no spills, clutter or unnecessary equipment. Stretcher in lowest position, wheels locked, appropriate side rails in place.

## 2019-01-31 NOTE — H&P ADULT - HISTORY OF PRESENT ILLNESS
CHIEF COMPLAINT:Patient is a 79y old  Female who presents with a chief complaint of chest pain.    HPI:  79 year old  Female with history of bradycardia, HTN, HLD, presents to ED for cardiologist office w/ c/o chest pain. Patient reports chest pain last night around 8pm and family wanted her to come to ED at that time but pt preferred to be seen in office. Pain resolved and pt is currently chest pain free but was advised to come to ED after being seen in office. She also reports mild nausea over the past few days.  She denies fevers, chills, sob, abdominal pain, vomiting, urinary issues    PAST MEDICAL & SURGICAL HISTORY:  Chronic headaches  Arthritis (ICD9 716.90)  High Cholesterol (ICD9 272.0)  Hypertensive Disease (ICD9 401.9)  No Past Medical History      MEDICATIONS  (STANDING):    MEDICATIONS  (PRN):      FAMILY HISTORY:      SOCIAL HISTORY:    [ ] Non-smoker  [ ] Smoker  [ ] Alcohol    Allergies    No Known Allergies    Intolerances    	    REVIEW OF SYSTEMS:  CONSTITUTIONAL: No fever, weight loss, or fatigue  EYES: No eye pain, visual disturbances, or discharge  ENT:  No difficulty hearing, tinnitus, vertigo; No sinus or throat pain  NECK: No pain or stiffness  RESPIRATORY: No cough, wheezing, chills or hemoptysis; No Shortness of Breath  CARDIOVASCULAR: + chest pain, palpitations, passing out, dizziness, or leg swelling  GASTROINTESTINAL: No abdominal or epigastric pain. No nausea, vomiting, or hematemesis; No diarrhea or constipation. No melena or hematochezia.  GENITOURINARY: No dysuria, frequency, hematuria, or incontinence  NEUROLOGICAL: No headaches, memory loss, loss of strength, numbness, or tremors  SKIN: No itching, burning, rashes, or lesions   LYMPH Nodes: No enlarged glands  ENDOCRINE: No heat or cold intolerance; No hair loss  MUSCULOSKELETAL: No joint pain or swelling; No muscle, back, or extremity pain  PSYCHIATRIC: No depression, anxiety, mood swings, or difficulty sleeping  HEME/LYMPH: No easy bruising, or bleeding gums  ALLERGY AND IMMUNOLOGIC: No hives or eczema	    [ ] All others negative	  [ ] Unable to obtain    PHYSICAL EXAM:  T(C): 36.7 (01-31-19 @ 15:24), Max: 36.7 (01-31-19 @ 15:24)  HR: 85 (01-31-19 @ 15:24) (85 - 93)  BP: 160/88 (01-31-19 @ 15:24) (160/88 - 163/105)  RR: 17 (01-31-19 @ 15:24) (17 - 18)  SpO2: 95% (01-31-19 @ 15:24) (93% - 95%)  Wt(kg): --  I&O's Summary      Appearance: Normal	  HEENT:   Normal oral mucosa, PERRL, EOMI	  Lymphatic: No lymphadenopathy  Cardiovascular: Normal S1 S2, No JVD, + murmurs, No edema  Respiratory: Lungs clear to auscultation	  Psychiatry: A & O x 3, Mood & affect appropriate  Gastrointestinal:  Soft, Non-tender, + BS	  Skin: No rashes, No ecchymoses, No cyanosis	  Neurologic: Non-focal  Extremities: Normal range of motion, No clubbing, cyanosis or edema  Vascular: Peripheral pulses palpable 2+ bilaterally    TELEMETRY: 	    ECG:  	  RADIOLOGY:  OTHER: 	  	  LABS:	 	    CARDIAC MARKERS:                              16.5   11.2  )-----------( 276      ( 31 Jan 2019 13:22 )             47.6     01-31    137  |  97  |  20  ----------------------------<  96  3.6   |  24  |  0.64    Ca    9.1      31 Jan 2019 13:22  Mg     1.6     01-31    TPro  8.1  /  Alb  4.4  /  TBili  0.5  /  DBili  x   /  AST  24  /  ALT  17  /  AlkPhos  68  01-31    proBNP: Serum Pro-Brain Natriuretic Peptide: 825 pg/mL (01-31 @ 13:22)    Lipid Profile:   HgA1c:   TSH:   PT/INR - ( 31 Jan 2019 13:22 )   PT: 14.8 sec;   INR: 1.29 ratio         PTT - ( 31 Jan 2019 13:22 )  PTT:38.6 sec    PREVIOUS DIAGNOSTIC TESTING:    < from: 12 Lead ECG (06.28.18 @ 08:12) >  Diagnosis Line ATRIAL FIBRILLATION  ST & MARKED T WAVE ABNORMALITY, CONSIDER ANTEROLATERAL ISCHEMIA  PROLONGED QT    < from: Xray Chest 2 Views PA/Lat (01.31.19 @ 13:29) >  The heart is mildly enlarged. The lungs are clear. There is no pleural   effusion or pneumothorax. Degenerative changes of the thoracic spine.   Dextroscoliosis.    < from: TTE with Doppler (w/Cont) (07.26.13 @ 16:15) >  1. Mitral annular calcification, otherwise normal mitral  valve. Mild mitral regurgitation.  2. Severely dilated left atrium.  LA volume index = 55  cc/m2.  3. Normal left ventricular internal dimensions and wall  thicknesses.  4. Endocardium not well visualized; grossly normal left  ventricular systolic function.  Endocardial visualization  enhanced with intravenous injection of echo contrast  (Definity).  5. Normal right ventricular size and function.  6. Estimated right ventricular systolic pressure equals 35  mm Hg, assuming right atrial pressure equals 10 mm Hg,  consistent with borderline pulmonary hypertension.

## 2019-01-31 NOTE — ED PROVIDER NOTE - OBJECTIVE STATEMENT
79 year old  Female with history of bradycardia, HTN, HLD, presents to ED for cardiologist office w/ c/o chest pain. Patient reports chest pain last night around 8pm and family wanted her to come to ED at that time but pt preferred to be seen in office. Pain resolved and pt is currently chest pain free but was advised to come to ED after being seen in office. She also reports mild nausea over the past few days.  She denies fevers, chills, sob, abdominal pain, vomiting, urinary issues

## 2019-02-01 ENCOUNTER — OUTPATIENT (OUTPATIENT)
Dept: OUTPATIENT SERVICES | Facility: HOSPITAL | Age: 79
LOS: 1 days | End: 2019-02-01
Payer: MEDICARE

## 2019-02-01 LAB
ALBUMIN SERPL ELPH-MCNC: 3.9 G/DL — SIGNIFICANT CHANGE UP (ref 3.3–5)
ALP SERPL-CCNC: 64 U/L — SIGNIFICANT CHANGE UP (ref 40–120)
ALT FLD-CCNC: 16 U/L — SIGNIFICANT CHANGE UP (ref 10–45)
ANION GAP SERPL CALC-SCNC: 14 MMOL/L — SIGNIFICANT CHANGE UP (ref 5–17)
AST SERPL-CCNC: 22 U/L — SIGNIFICANT CHANGE UP (ref 10–40)
BILIRUB SERPL-MCNC: 0.4 MG/DL — SIGNIFICANT CHANGE UP (ref 0.2–1.2)
BUN SERPL-MCNC: 20 MG/DL — SIGNIFICANT CHANGE UP (ref 7–23)
CALCIUM SERPL-MCNC: 9.7 MG/DL — SIGNIFICANT CHANGE UP (ref 8.4–10.5)
CHLORIDE SERPL-SCNC: 99 MMOL/L — SIGNIFICANT CHANGE UP (ref 96–108)
CHOLEST SERPL-MCNC: 165 MG/DL — SIGNIFICANT CHANGE UP (ref 10–199)
CO2 SERPL-SCNC: 25 MMOL/L — SIGNIFICANT CHANGE UP (ref 22–31)
CREAT SERPL-MCNC: 0.64 MG/DL — SIGNIFICANT CHANGE UP (ref 0.5–1.3)
GLUCOSE SERPL-MCNC: 84 MG/DL — SIGNIFICANT CHANGE UP (ref 70–99)
HCT VFR BLD CALC: 44.7 % — SIGNIFICANT CHANGE UP (ref 34.5–45)
HDLC SERPL-MCNC: 61 MG/DL — SIGNIFICANT CHANGE UP
HGB BLD-MCNC: 15.3 G/DL — SIGNIFICANT CHANGE UP (ref 11.5–15.5)
LIPID PNL WITH DIRECT LDL SERPL: 82 MG/DL — SIGNIFICANT CHANGE UP
MCHC RBC-ENTMCNC: 32.8 PG — SIGNIFICANT CHANGE UP (ref 27–34)
MCHC RBC-ENTMCNC: 34.2 GM/DL — SIGNIFICANT CHANGE UP (ref 32–36)
MCV RBC AUTO: 95.7 FL — SIGNIFICANT CHANGE UP (ref 80–100)
PLATELET # BLD AUTO: 246 K/UL — SIGNIFICANT CHANGE UP (ref 150–400)
POTASSIUM SERPL-MCNC: 3.5 MMOL/L — SIGNIFICANT CHANGE UP (ref 3.5–5.3)
POTASSIUM SERPL-SCNC: 3.5 MMOL/L — SIGNIFICANT CHANGE UP (ref 3.5–5.3)
PROT SERPL-MCNC: 7.2 G/DL — SIGNIFICANT CHANGE UP (ref 6–8.3)
RBC # BLD: 4.67 M/UL — SIGNIFICANT CHANGE UP (ref 3.8–5.2)
RBC # FLD: 13.6 % — SIGNIFICANT CHANGE UP (ref 10.3–14.5)
SODIUM SERPL-SCNC: 138 MMOL/L — SIGNIFICANT CHANGE UP (ref 135–145)
TOTAL CHOLESTEROL/HDL RATIO MEASUREMENT: 2.7 RATIO — LOW (ref 3.3–7.1)
TRIGL SERPL-MCNC: 110 MG/DL — SIGNIFICANT CHANGE UP (ref 10–149)
TROPONIN T, HIGH SENSITIVITY RESULT: 8 NG/L — SIGNIFICANT CHANGE UP (ref 0–51)
TSH SERPL-MCNC: 1.32 UIU/ML — SIGNIFICANT CHANGE UP (ref 0.27–4.2)
WBC # BLD: 8.78 K/UL — SIGNIFICANT CHANGE UP (ref 3.8–10.5)
WBC # FLD AUTO: 8.78 K/UL — SIGNIFICANT CHANGE UP (ref 3.8–10.5)

## 2019-02-01 PROCEDURE — 93454 CORONARY ARTERY ANGIO S&I: CPT | Mod: 26,GC

## 2019-02-01 PROCEDURE — 99152 MOD SED SAME PHYS/QHP 5/>YRS: CPT | Mod: GC

## 2019-02-01 PROCEDURE — G9001: CPT

## 2019-02-01 RX ORDER — APIXABAN 2.5 MG/1
5 TABLET, FILM COATED ORAL EVERY 12 HOURS
Qty: 0 | Refills: 0 | Status: DISCONTINUED | OUTPATIENT
Start: 2019-02-01 | End: 2019-02-02

## 2019-02-01 RX ADMIN — AMLODIPINE BESYLATE 5 MILLIGRAM(S): 2.5 TABLET ORAL at 06:27

## 2019-02-01 RX ADMIN — Medication 1 TABLET(S): at 12:07

## 2019-02-01 RX ADMIN — ATORVASTATIN CALCIUM 40 MILLIGRAM(S): 80 TABLET, FILM COATED ORAL at 05:33

## 2019-02-01 RX ADMIN — Medication 667 MILLIGRAM(S): at 05:33

## 2019-02-01 RX ADMIN — ATENOLOL 25 MILLIGRAM(S): 25 TABLET ORAL at 06:27

## 2019-02-01 RX ADMIN — Medication 667 MILLIGRAM(S): at 08:47

## 2019-02-01 RX ADMIN — Medication 81 MILLIGRAM(S): at 12:06

## 2019-02-01 RX ADMIN — LOSARTAN POTASSIUM 100 MILLIGRAM(S): 100 TABLET, FILM COATED ORAL at 06:27

## 2019-02-01 RX ADMIN — Medication 667 MILLIGRAM(S): at 12:07

## 2019-02-01 RX ADMIN — ATORVASTATIN CALCIUM 40 MILLIGRAM(S): 80 TABLET, FILM COATED ORAL at 22:25

## 2019-02-01 RX ADMIN — Medication 12.5 MILLIGRAM(S): at 06:27

## 2019-02-01 NOTE — PROGRESS NOTE ADULT - SUBJECTIVE AND OBJECTIVE BOX
CARDIOLOGY     PROGRESS  NOTE   ________________________________________________    CHIEF COMPLAINT:Patient is a 79y old  Female who presents with a chief complaint of chest pain (31 Jan 2019 23:09)    	  REVIEW OF SYSTEMS:  CONSTITUTIONAL: No fever, weight loss, or fatigue  EYES: No eye pain, visual disturbances, or discharge  ENT:  No difficulty hearing, tinnitus, vertigo; No sinus or throat pain  NECK: No pain or stiffness  RESPIRATORY: No cough, wheezing, chills or hemoptysis; No Shortness of Breath  CARDIOVASCULAR: No chest pain, palpitations, passing out, dizziness, or leg swelling  GASTROINTESTINAL: No abdominal or epigastric pain. No nausea, vomiting, or hematemesis; No diarrhea or constipation. No melena or hematochezia.  GENITOURINARY: No dysuria, frequency, hematuria, or incontinence  NEUROLOGICAL: No headaches, memory loss, loss of strength, numbness, or tremors  SKIN: No itching, burning, rashes, or lesions   LYMPH Nodes: No enlarged glands  ENDOCRINE: No heat or cold intolerance; No hair loss  MUSCULOSKELETAL: No joint pain or swelling; No muscle, back, or extremity pain  PSYCHIATRIC: No depression, anxiety, mood swings, or difficulty sleeping  HEME/LYMPH: No easy bruising, or bleeding gums  ALLERGY AND IMMUNOLOGIC: No hives or eczema	    [ ] All others negative	  [ ] Unable to obtain    PHYSICAL EXAM:  T(C): 36.4 (02-01-19 @ 04:17), Max: 36.7 (01-31-19 @ 15:24)  HR: 65 (02-01-19 @ 04:17) (65 - 93)  BP: 128/74 (02-01-19 @ 04:17) (128/74 - 163/105)  RR: 18 (02-01-19 @ 04:17) (17 - 18)  SpO2: 98% (02-01-19 @ 04:17) (93% - 98%)  Wt(kg): --  I&O's Summary    31 Jan 2019 07:01  -  01 Feb 2019 07:00  --------------------------------------------------------  IN: 240 mL / OUT: 200 mL / NET: 40 mL        Appearance: Normal	  HEENT:   Normal oral mucosa, PERRL, EOMI	  Lymphatic: No lymphadenopathy  Cardiovascular: Normal S1 S2, No JVD, + murmurs, No edema  Respiratory: Lungs clear to auscultation	  Psychiatry: A & O x 3, Mood & affect appropriate  Gastrointestinal:  Soft, Non-tender, + BS	  Skin: No rashes, No ecchymoses, No cyanosis	  Neurologic: Non-focal  Extremities: Normal range of motion, No clubbing, cyanosis or edema  Vascular: Peripheral pulses palpable 2+ bilaterally    MEDICATIONS  (STANDING):  amLODIPine   Tablet 5 milliGRAM(s) Oral daily  aspirin enteric coated 81 milliGRAM(s) Oral daily  ATENolol  Tablet 25 milliGRAM(s) Oral daily  atorvastatin 40 milliGRAM(s) Oral at bedtime  calcium acetate 667 milliGRAM(s) Oral four times a day with meals  hydrochlorothiazide 12.5 milliGRAM(s) Oral daily  losartan 100 milliGRAM(s) Oral daily  multivitamin 1 Tablet(s) Oral daily      TELEMETRY: 	    ECG:  	  RADIOLOGY:  OTHER: 	  	  LABS:	 	    CARDIAC MARKERS:                                15.3   8.78  )-----------( 246      ( 01 Feb 2019 08:13 )             44.7     02-01    138  |  99  |  20  ----------------------------<  84  3.5   |  25  |  0.64    Ca    9.7      01 Feb 2019 05:54  Mg     1.6     01-31    TPro  7.2  /  Alb  3.9  /  TBili  0.4  /  DBili  x   /  AST  22  /  ALT  16  /  AlkPhos  64  02-01    proBNP: Serum Pro-Brain Natriuretic Peptide: 825 pg/mL (01-31 @ 13:22)    Lipid Profile:   HgA1c:   TSH:   PT/INR - ( 31 Jan 2019 13:22 )   PT: 14.8 sec;   INR: 1.29 ratio         PTT - ( 31 Jan 2019 13:22 )  PTT:38.6 sec  Troponin T, High Sensitivity (02.01.19 @ 04:12)    Troponin T, High Sensitivity Result: 8: Rapid upward or downward changes in high-sensitivity troponin levels  suggest acute myocardial injury. Renal impairment may cause sustained  troponin elevations.  Normal: <6 - 14 ng/L  Indeterminate: 15-51 ng/L  Elevated: > 51 ng/L  See http://labs/test/TROPTHS on the [facility] intranet for more  information ng/L        Assessment and plan  ---------------------------  unstable angina  cath today  restart on ac post cath

## 2019-02-01 NOTE — PROGRESS NOTE ADULT - SUBJECTIVE AND OBJECTIVE BOX
RENEE KOREY  79y  Female      Patient is a 79y old  Female who presents with a chief complaint of chest pain (01 Feb 2019 08:42)      INTERVAL HPI/OVERNIGHT EVENTS:        REVIEW OF SYSTEMS:  CONSTITUTIONAL: No fever, weight loss, + fatigue  EYES: No eye pain, visual disturbances, or discharge  ENMT:   No sinus or throat pain  NECK: No pain or stiffness  BREASTS: No pain, masses, or nipple discharge  RESPIRATORY: No cough, wheezing, chills or hemoptysis; No shortness of breath  CARDIOVASCULAR: S/P chest pain, palpitations, dizziness, or leg swelling  GASTROINTESTINAL: No abdominal or epigastric pain. No nausea, vomiting, or hematemesis; No diarrhea or constipation. No melena or hematochezia.  GENITOURINARY: No hematuria  NEUROLOGICAL: demented    SKIN: No itching, burning, rashes, or lesions   LYMPH NODES: No enlarged glands  ENDOCRINE: No heat or cold intolerance; No hair loss  MUSCULOSKELETAL:Lt shoulder pain    HEME/LYMPH: No easy bruising, or bleeding gums  ALLERY AND IMMUNOLOGIC: No hives or eczema    T(C): 37.1 (02-01-19 @ 11:33), Max: 37.1 (02-01-19 @ 11:33)  HR: 80 (02-01-19 @ 11:33) (65 - 80)  BP: 125/85 (02-01-19 @ 11:33) (125/85 - 138/70)  RR: 18 (02-01-19 @ 11:33) (18 - 18)  SpO2: 96% (02-01-19 @ 11:33) (96% - 98%)  Wt(kg): --Vital Signs Last 24 Hrs  T(C): 37.1 (01 Feb 2019 11:33), Max: 37.1 (01 Feb 2019 11:33)  T(F): 98.7 (01 Feb 2019 11:33), Max: 98.7 (01 Feb 2019 11:33)  HR: 80 (01 Feb 2019 11:33) (65 - 80)  BP: 125/85 (01 Feb 2019 11:33) (125/85 - 138/70)  BP(mean): --  RR: 18 (01 Feb 2019 11:33) (18 - 18)  SpO2: 96% (01 Feb 2019 11:33) (96% - 98%)    PHYSICAL EXAM:  GENERAL: NAD, well-groomed, well-developed  HEAD:  Atraumatic, Normocephalic  EYES: EOMI, PERRLA, conjunctiva and sclera clear  ENMT: No tonsillar erythema, exudates, or enlargement; Moist mucous membranes, Good dentition, No lesions  NECK: Supple, No JVD, Normal thyroid  NERVOUS SYSTEM:  demented.no focal neurodeficit.  CHEST/LUNG: Clear to percussion bilaterally; No rales, rhonchi, wheezing, or rubs  HEART: Regular rate and rhythm; No murmurs, rubs, or gallops  ABDOMEN: Soft, Nontender, Nondistended; Bowel sounds present  EXTREMITIES:  2+ Peripheral Pulses, No clubbing, cyanosis.  LYMPH: No lymphadenopathy noted  SKIN: No rashes or lesions    Consultant(s) Notes Reviewed:  [x ] YES  [ ] NO  Care Discussed with Consultants/Other Providers [ x] YES  [ ] NO    LABS:                        15.3   8.78  )-----------( 246      ( 01 Feb 2019 08:13 )             44.7     02-01    138  |  99  |  20  ----------------------------<  84  3.5   |  25  |  0.64    Ca    9.7      01 Feb 2019 05:54  Mg     1.6     01-31    TPro  7.2  /  Alb  3.9  /  TBili  0.4  /  DBili  x   /  AST  22  /  ALT  16  /  AlkPhos  64  02-01    PT/INR - ( 31 Jan 2019 13:22 )   PT: 14.8 sec;   INR: 1.29 ratio         PTT - ( 31 Jan 2019 13:22 )  PTT:38.6 sec    CAPILLARY BLOOD GLUCOSE                RADIOLOGY & ADDITIONAL TESTS:    Imaging Personally Reviewed:  [ ] YES  [ ] NO

## 2019-02-02 ENCOUNTER — TRANSCRIPTION ENCOUNTER (OUTPATIENT)
Age: 79
End: 2019-02-02

## 2019-02-02 VITALS
SYSTOLIC BLOOD PRESSURE: 101 MMHG | RESPIRATION RATE: 18 BRPM | TEMPERATURE: 98 F | OXYGEN SATURATION: 95 % | DIASTOLIC BLOOD PRESSURE: 63 MMHG | HEART RATE: 60 BPM

## 2019-02-02 LAB
ANION GAP SERPL CALC-SCNC: 15 MMOL/L — SIGNIFICANT CHANGE UP (ref 5–17)
BUN SERPL-MCNC: 19 MG/DL — SIGNIFICANT CHANGE UP (ref 7–23)
CALCIUM SERPL-MCNC: 9 MG/DL — SIGNIFICANT CHANGE UP (ref 8.4–10.5)
CHLORIDE SERPL-SCNC: 100 MMOL/L — SIGNIFICANT CHANGE UP (ref 96–108)
CO2 SERPL-SCNC: 24 MMOL/L — SIGNIFICANT CHANGE UP (ref 22–31)
CREAT SERPL-MCNC: 0.69 MG/DL — SIGNIFICANT CHANGE UP (ref 0.5–1.3)
GLUCOSE SERPL-MCNC: 87 MG/DL — SIGNIFICANT CHANGE UP (ref 70–99)
HCT VFR BLD CALC: 46.5 % — HIGH (ref 34.5–45)
HGB BLD-MCNC: 15.9 G/DL — HIGH (ref 11.5–15.5)
MCHC RBC-ENTMCNC: 33.1 PG — SIGNIFICANT CHANGE UP (ref 27–34)
MCHC RBC-ENTMCNC: 34.2 GM/DL — SIGNIFICANT CHANGE UP (ref 32–36)
MCV RBC AUTO: 96.9 FL — SIGNIFICANT CHANGE UP (ref 80–100)
PLATELET # BLD AUTO: 275 K/UL — SIGNIFICANT CHANGE UP (ref 150–400)
POTASSIUM SERPL-MCNC: 3.5 MMOL/L — SIGNIFICANT CHANGE UP (ref 3.5–5.3)
POTASSIUM SERPL-SCNC: 3.5 MMOL/L — SIGNIFICANT CHANGE UP (ref 3.5–5.3)
RBC # BLD: 4.8 M/UL — SIGNIFICANT CHANGE UP (ref 3.8–5.2)
RBC # FLD: 13.1 % — SIGNIFICANT CHANGE UP (ref 10.3–14.5)
SODIUM SERPL-SCNC: 139 MMOL/L — SIGNIFICANT CHANGE UP (ref 135–145)
WBC # BLD: 7.46 K/UL — SIGNIFICANT CHANGE UP (ref 3.8–10.5)
WBC # FLD AUTO: 7.46 K/UL — SIGNIFICANT CHANGE UP (ref 3.8–10.5)

## 2019-02-02 PROCEDURE — C1769: CPT

## 2019-02-02 PROCEDURE — C1894: CPT

## 2019-02-02 PROCEDURE — 82803 BLOOD GASES ANY COMBINATION: CPT

## 2019-02-02 PROCEDURE — 85610 PROTHROMBIN TIME: CPT

## 2019-02-02 PROCEDURE — 93005 ELECTROCARDIOGRAM TRACING: CPT

## 2019-02-02 PROCEDURE — 85014 HEMATOCRIT: CPT

## 2019-02-02 PROCEDURE — 83605 ASSAY OF LACTIC ACID: CPT

## 2019-02-02 PROCEDURE — 83690 ASSAY OF LIPASE: CPT

## 2019-02-02 PROCEDURE — 82435 ASSAY OF BLOOD CHLORIDE: CPT

## 2019-02-02 PROCEDURE — 84132 ASSAY OF SERUM POTASSIUM: CPT

## 2019-02-02 PROCEDURE — 99285 EMERGENCY DEPT VISIT HI MDM: CPT | Mod: 25

## 2019-02-02 PROCEDURE — 93010 ELECTROCARDIOGRAM REPORT: CPT

## 2019-02-02 PROCEDURE — C1887: CPT

## 2019-02-02 PROCEDURE — 85027 COMPLETE CBC AUTOMATED: CPT

## 2019-02-02 PROCEDURE — 80053 COMPREHEN METABOLIC PANEL: CPT

## 2019-02-02 PROCEDURE — 71046 X-RAY EXAM CHEST 2 VIEWS: CPT

## 2019-02-02 PROCEDURE — 85730 THROMBOPLASTIN TIME PARTIAL: CPT

## 2019-02-02 PROCEDURE — 83880 ASSAY OF NATRIURETIC PEPTIDE: CPT

## 2019-02-02 PROCEDURE — 84484 ASSAY OF TROPONIN QUANT: CPT

## 2019-02-02 PROCEDURE — 82947 ASSAY GLUCOSE BLOOD QUANT: CPT

## 2019-02-02 PROCEDURE — 82330 ASSAY OF CALCIUM: CPT

## 2019-02-02 PROCEDURE — 80061 LIPID PANEL: CPT

## 2019-02-02 PROCEDURE — 84443 ASSAY THYROID STIM HORMONE: CPT

## 2019-02-02 PROCEDURE — 80048 BASIC METABOLIC PNL TOTAL CA: CPT

## 2019-02-02 PROCEDURE — 83735 ASSAY OF MAGNESIUM: CPT

## 2019-02-02 PROCEDURE — 99152 MOD SED SAME PHYS/QHP 5/>YRS: CPT

## 2019-02-02 PROCEDURE — 84295 ASSAY OF SERUM SODIUM: CPT

## 2019-02-02 PROCEDURE — 93454 CORONARY ARTERY ANGIO S&I: CPT

## 2019-02-02 RX ORDER — GABAPENTIN 400 MG/1
1 CAPSULE ORAL
Qty: 0 | Refills: 0 | COMMUNITY

## 2019-02-02 RX ORDER — GABAPENTIN 400 MG/1
200 CAPSULE ORAL AT BEDTIME
Qty: 0 | Refills: 0 | Status: DISCONTINUED | OUTPATIENT
Start: 2019-02-02 | End: 2019-02-02

## 2019-02-02 RX ORDER — CALCIPOTRIENE 50 UG/G
1 CREAM TOPICAL
Qty: 0 | Refills: 0 | COMMUNITY

## 2019-02-02 RX ORDER — LATANOPROST 0.05 MG/ML
1 SOLUTION/ DROPS OPHTHALMIC; TOPICAL
Qty: 0 | Refills: 0 | COMMUNITY

## 2019-02-02 RX ORDER — CALCIUM ACETATE 667 MG
1 TABLET ORAL
Qty: 30 | Refills: 0
Start: 2019-02-02 | End: 2019-03-03

## 2019-02-02 RX ORDER — CHOLECALCIFEROL (VITAMIN D3) 125 MCG
1 CAPSULE ORAL
Qty: 0 | Refills: 0 | COMMUNITY

## 2019-02-02 RX ORDER — ATORVASTATIN CALCIUM 80 MG/1
1 TABLET, FILM COATED ORAL
Qty: 30 | Refills: 0
Start: 2019-02-02 | End: 2019-03-03

## 2019-02-02 RX ORDER — APIXABAN 2.5 MG/1
5 TABLET, FILM COATED ORAL ONCE
Qty: 0 | Refills: 0 | Status: COMPLETED | OUTPATIENT
Start: 2019-02-02 | End: 2019-02-02

## 2019-02-02 RX ORDER — LOSARTAN POTASSIUM 100 MG/1
1 TABLET, FILM COATED ORAL
Qty: 30 | Refills: 0
Start: 2019-02-02 | End: 2019-03-03

## 2019-02-02 RX ORDER — ASPIRIN/CALCIUM CARB/MAGNESIUM 324 MG
1 TABLET ORAL
Qty: 30 | Refills: 0
Start: 2019-02-02 | End: 2019-03-03

## 2019-02-02 RX ORDER — ATENOLOL 25 MG/1
1 TABLET ORAL
Qty: 0 | Refills: 0 | COMMUNITY

## 2019-02-02 RX ORDER — ROSUVASTATIN CALCIUM 5 MG/1
1 TABLET ORAL
Qty: 0 | Refills: 0 | COMMUNITY

## 2019-02-02 RX ORDER — PREGABALIN 225 MG/1
1 CAPSULE ORAL
Qty: 0 | Refills: 0 | COMMUNITY

## 2019-02-02 RX ORDER — OXYCODONE HYDROCHLORIDE 5 MG/1
1 TABLET ORAL
Qty: 0 | Refills: 0 | COMMUNITY

## 2019-02-02 RX ORDER — GABAPENTIN 400 MG/1
2 CAPSULE ORAL
Qty: 60 | Refills: 0
Start: 2019-02-02 | End: 2019-03-03

## 2019-02-02 RX ORDER — LOSARTAN/HYDROCHLOROTHIAZIDE 100MG-25MG
1 TABLET ORAL
Qty: 0 | Refills: 0 | COMMUNITY

## 2019-02-02 RX ADMIN — Medication 12.5 MILLIGRAM(S): at 05:56

## 2019-02-02 RX ADMIN — LOSARTAN POTASSIUM 100 MILLIGRAM(S): 100 TABLET, FILM COATED ORAL at 08:43

## 2019-02-02 RX ADMIN — APIXABAN 5 MILLIGRAM(S): 2.5 TABLET, FILM COATED ORAL at 11:27

## 2019-02-02 RX ADMIN — ATENOLOL 25 MILLIGRAM(S): 25 TABLET ORAL at 05:56

## 2019-02-02 RX ADMIN — Medication 667 MILLIGRAM(S): at 11:27

## 2019-02-02 RX ADMIN — Medication 1 TABLET(S): at 11:27

## 2019-02-02 RX ADMIN — AMLODIPINE BESYLATE 5 MILLIGRAM(S): 2.5 TABLET ORAL at 08:43

## 2019-02-02 RX ADMIN — APIXABAN 5 MILLIGRAM(S): 2.5 TABLET, FILM COATED ORAL at 00:50

## 2019-02-02 RX ADMIN — Medication 667 MILLIGRAM(S): at 08:43

## 2019-02-02 RX ADMIN — Medication 81 MILLIGRAM(S): at 11:27

## 2019-02-02 NOTE — DISCHARGE NOTE ADULT - CARE PROVIDER_API CALL
Kell Person (DO)  Cardiology Medicine  [address] [place] [address], [address]  [place] [address]  Phone: [phone]  Fax: [phone]

## 2019-02-02 NOTE — DISCHARGE NOTE ADULT - CARE PLAN
Principal Discharge DX:	Chest pain  Goal:	Resolved  Assessment and plan of treatment:	HOME CARE INSTRUCTIONS  For the next few days, avoid physical activities that bring on chest pain. Continue physical activities as directed.  Do not smoke.  Avoid drinking alcohol.   Only take over-the-counter or prescription medicine for pain, discomfort, or fever as directed by your caregiver.  Follow your caregiver's suggestions for further testing if your chest pain does not go away.  Keep any follow-up appointments you made. If you do not go to an appointment, you could develop lasting (chronic) problems with pain. If there is any problem keeping an appointment, you must call to reschedule.   SEEK MEDICAL CARE IF:  You think you are having problems from the medicine you are taking. Read your medicine instructions carefully.  Your chest pain does not go away, even after treatment.  You develop a rash with blisters on your chest.  SEEK IMMEDIATE MEDICAL CARE IF:  You have increased chest pain or pain that spreads to your arm, neck, jaw, back, or abdomen.   You develop shortness of breath, an increasing cough, or you are coughing up blood.  You have severe back or abdominal pain, feel nauseous, or vomit.  You develop severe weakness, fainting, or chills.  You have a fever.  THIS IS AN EMERGENCY. Do not wait to see if the pain will go away. Get medical help at once. Call your local emergency services (___[phone]__________________). Do not drive yourself to the hospital.  Secondary Diagnosis:	High cholesterol  Assessment and plan of treatment:	Continue with your cholesterol medications. Eat a heart healthy diet that is low in saturated fats and salt, and includes whole grains, fruits, vegetables and lean protein; exercise regularly (consult with your physician or cardiologist first); maintain a heart healthy weight; if you smoke - quit (A resource to help you stop smoking is the [facility] – phone number [phone].). Continue to follow with your primary physician or cardiologist.  Seek medical help for dizziness, Lightheadedness, Blurry vision, Headache, Chest pain, Shortness of breath  Secondary Diagnosis:	Hypertension  Assessment and plan of treatment:	Low salt diet  Activity as tolerated.  Take all medication as prescribed.  Follow up with your medical doctor for routine blood pressure monitoring at your next visit.  Notify your doctor if you have any of the following symptoms:   Dizziness, Lightheadedness, Blurry vision, Headache, Chest pain, Shortness of breath

## 2019-02-02 NOTE — DISCHARGE NOTE ADULT - HOSPITAL COURSE
79 year old  Female with history of bradycardia, HTN, HLD, presents to ED for cardiologist office w/ c/o chest pain. Patient reports chest pain last night around 8pm and family wanted her to come to ED    Admit  Dx  Chest  pain  R/o  ACS :  Troponin negative.  2/1  s/p diagnostic LHC via RRA access dLAD mild diffuse, RCA mild diffuse. Patient is stable for discharge.  as per attending. Follow up with cardiology ( Dr. Pesron) within 2 weeks.

## 2019-02-02 NOTE — DISCHARGE NOTE ADULT - PLAN OF CARE
[facility] CARE INSTRUCTIONS  For the next few days, avoid physical activities that bring on chest pain. Continue physical activities as directed.  Do not smoke.  Avoid drinking alcohol.   Only take over-the-counter or prescription medicine for pain, discomfort, or fever as directed by your caregiver.  Follow your caregiver's suggestions for further testing if your chest pain does not go away.  Keep any follow-up appointments you made. If you do not go to an appointment, you could develop lasting (chronic) problems with pain. If there is any problem keeping an appointment, you must call to reschedule.   SEEK MEDICAL CARE IF:  You think you are having problems from the medicine you are taking. Read your medicine instructions carefully.  Your chest pain does not go away, even after treatment.  You develop a rash with blisters on your chest.  SEEK IMMEDIATE MEDICAL CARE IF:  You have increased chest pain or pain that spreads to your arm, neck, jaw, back, or abdomen.   You develop shortness of breath, an increasing cough, or you are coughing up blood.  You have severe back or abdominal pain, feel nauseous, or vomit.  You develop severe weakness, fainting, or chills.  You have a fever.  THIS IS AN EMERGENCY. Do not wait to see if the pain will go away. Get medical help at once. Call your local emergency services (___[phone]__________________). Do not drive yourself to the hospital. Continue with your cholesterol medications. Eat a heart healthy diet that is low in saturated fats and salt, and includes whole grains, fruits, vegetables and lean protein; exercise regularly (consult with your physician or cardiologist first); maintain a heart healthy weight; if you smoke - quit (A resource to help you stop smoking is the [facility] – phone number [phone].). Continue to follow with your primary physician or cardiologist.  Seek medical help for dizziness, Lightheadedness, Blurry vision, Headache, Chest pain, Shortness of breath Low salt diet  Activity as tolerated.  Take all medication as prescribed.  Follow up with your medical doctor for routine blood pressure monitoring at your next visit.  Notify your doctor if you have any of the following symptoms:   Dizziness, Lightheadedness, Blurry vision, Headache, Chest pain, Shortness of breath

## 2019-02-02 NOTE — DISCHARGE NOTE ADULT - MEDICATION SUMMARY - MEDICATIONS TO TAKE
I will START or STAY ON the medications listed below when I get home from the hospital:    acetaminophen 500 mg oral tablet  -- 1 tab(s) by mouth 3 times a day, As Needed  -- Indication: For pain    aspirin 81 mg oral delayed release tablet  -- 1 tab(s) by mouth once a day  -- Indication: For preventive measure    losartan 100 mg oral tablet  -- 1 tab(s) by mouth once a day  -- Indication: For Hypertension    apixaban 5 mg oral tablet  -- 1 tab(s) by mouth every 12 hours  -- Indication: For Afib    gabapentin 100 mg oral capsule  -- 2 cap(s) by mouth once a day (at bedtime)  -- Indication: For neuropathy    atorvastatin 40 mg oral tablet  -- 1 tab(s) by mouth once a day (at bedtime)  -- Indication: For High cholesterol    amLODIPine 5 mg oral tablet  -- 1 tab(s) by mouth once a day  -- Indication: For Hypertension    hydroCHLOROthiazide 12.5 mg oral capsule  -- 1 cap(s) by mouth once a day  -- Indication: For Hypertension    docusate sodium 100 mg oral capsule  -- 1 cap(s) by mouth once a day, As Needed  -- Indication: For Constipation    senna oral tablet  -- 1 tab(s) by mouth once a day (at bedtime), As Needed  -- Indication: For Constipation    calcium acetate 667 mg oral tablet  -- 1 tab(s) by mouth once a day   -- Indication: For supplement    NexIUM 40 mg oral delayed release capsule  -- 1 cap(s) by mouth once a day  -- Indication: For GI    Multiple Vitamins oral tablet  -- 1 tab(s) by mouth once a day  -- Indication: For Supplement

## 2019-02-02 NOTE — DISCHARGE NOTE ADULT - PROVIDER TOKENS
Extubated to bipap yesterday. Making progress. Off the bipap this morning & maintaining well.   TOKEN:'[id number]'

## 2019-02-02 NOTE — PROGRESS NOTE ADULT - SUBJECTIVE AND OBJECTIVE BOX
CARDIOLOGY     PROGRESS  NOTE   ________________________________________________    CHIEF COMPLAINT:Patient is a 79y old  Female who presents with a chief complaint of chest pain (01 Feb 2019 15:54)  no mcomplain.  	  REVIEW OF SYSTEMS:  CONSTITUTIONAL: No fever, weight loss, or fatigue  EYES: No eye pain, visual disturbances, or discharge  ENT:  No difficulty hearing, tinnitus, vertigo; No sinus or throat pain  NECK: No pain or stiffness  RESPIRATORY: No cough, wheezing, chills or hemoptysis; No Shortness of Breath  CARDIOVASCULAR: No chest pain, palpitations, passing out, dizziness, or leg swelling  GASTROINTESTINAL: No abdominal or epigastric pain. No nausea, vomiting, or hematemesis; No diarrhea or constipation. No melena or hematochezia.  GENITOURINARY: No dysuria, frequency, hematuria, or incontinence  NEUROLOGICAL: No headaches, memory loss, loss of strength, numbness, or tremors  SKIN: No itching, burning, rashes, or lesions   LYMPH Nodes: No enlarged glands  ENDOCRINE: No heat or cold intolerance; No hair loss  MUSCULOSKELETAL: No joint pain or swelling; No muscle, back, or extremity pain  PSYCHIATRIC: No depression, anxiety, mood swings, or difficulty sleeping  HEME/LYMPH: No easy bruising, or bleeding gums  ALLERGY AND IMMUNOLOGIC: No hives or eczema	    [ ] All others negative	  [ ] Unable to obtain    PHYSICAL EXAM:  T(C): 36.4 (02-02-19 @ 04:31), Max: 37.1 (02-01-19 @ 11:33)  HR: 71 (02-02-19 @ 04:31) (65 - 82)  BP: 129/88 (02-02-19 @ 04:31) (106/75 - 137/89)  RR: 18 (02-02-19 @ 04:31) (17 - 18)  SpO2: 97% (02-02-19 @ 04:31) (89% - 97%)  Wt(kg): --  I&O's Summary    01 Feb 2019 07:01  -  02 Feb 2019 07:00  --------------------------------------------------------  IN: 240 mL / OUT: 0 mL / NET: 240 mL    02 Feb 2019 07:01  -  02 Feb 2019 09:12  --------------------------------------------------------  IN: 240 mL / OUT: 0 mL / NET: 240 mL        Appearance: Normal	  HEENT:   Normal oral mucosa, PERRL, EOMI	  Lymphatic: No lymphadenopathy  Cardiovascular: Normal S1 S2, No JVD, No murmurs, No edema  Respiratory: Lungs clear to auscultation	  Psychiatry: A & O x 3, Mood & affect appropriate  Gastrointestinal:  Soft, Non-tender, + BS	  Skin: No rashes, No ecchymoses, No cyanosis	  Neurologic: Non-focal  Extremities: Normal range of motion, No clubbing, cyanosis or edema  Vascular: Peripheral pulses palpable 2+ bilaterally    MEDICATIONS  (STANDING):  amLODIPine   Tablet 5 milliGRAM(s) Oral daily  apixaban 5 milliGRAM(s) Oral every 12 hours  aspirin enteric coated 81 milliGRAM(s) Oral daily  ATENolol  Tablet 25 milliGRAM(s) Oral daily  atorvastatin 40 milliGRAM(s) Oral at bedtime  calcium acetate 667 milliGRAM(s) Oral four times a day with meals  hydrochlorothiazide 12.5 milliGRAM(s) Oral daily  losartan 100 milliGRAM(s) Oral daily  multivitamin 1 Tablet(s) Oral daily      TELEMETRY: 	    ECG:  	  RADIOLOGY:  OTHER: 	  	  LABS:	 	    CARDIAC MARKERS:                                15.9   7.46  )-----------( 275      ( 02 Feb 2019 08:24 )             46.5     02-02    139  |  100  |  19  ----------------------------<  87  3.5   |  24  |  0.69    Ca    9.0      02 Feb 2019 06:18  Mg     1.6     01-31    TPro  7.2  /  Alb  3.9  /  TBili  0.4  /  DBili  x   /  AST  22  /  ALT  16  /  AlkPhos  64  02-01    proBNP: Serum Pro-Brain Natriuretic Peptide: 825 pg/mL (01-31 @ 13:22)    Lipid Profile: Cholesterol 165  LDL 82  HDL 61      HgA1c:   TSH: Thyroid Stimulating Hormone, Serum: 1.32 uIU/mL (02-01 @ 08:53)    PT/INR - ( 31 Jan 2019 13:22 )   PT: 14.8 sec;   INR: 1.29 ratio         PTT - ( 31 Jan 2019 13:22 )  PTT:38.6 sec  < from: Cardiac Cath Lab - Adult (02.01.19 @ 18:54) >  CORONARY VESSELS: The coronary circulation is right dominant.  LM:   --  LM: Normal.  LAD:   --  Mid LAD: There was a 40 % stenosis.  CX:   --  Circumflex: Normal.  RCA:   --  Mid RCA: Angiography showed mild atherosclerosis with no flow  limiting lesions.  COMPLICATIONS: There were no complications.    < end of copied text >      Assessment and plan  ---------------------------  s/p cath  doing well  dc home CARDIOLOGY     PROGRESS  NOTE   ________________________________________________    CHIEF COMPLAINT:Patient is a 79y old  Female who presents with a chief complaint of chest pain (01 Feb 2019 15:54)  no mcomplain.  	  REVIEW OF SYSTEMS:  CONSTITUTIONAL: No fever, weight loss, or fatigue  EYES: No eye pain, visual disturbances, or discharge  ENT:  No difficulty hearing, tinnitus, vertigo; No sinus or throat pain  NECK: No pain or stiffness  RESPIRATORY: No cough, wheezing, chills or hemoptysis; No Shortness of Breath  CARDIOVASCULAR: No chest pain, palpitations, passing out, dizziness, or leg swelling  GASTROINTESTINAL: No abdominal or epigastric pain. No nausea, vomiting, or hematemesis; No diarrhea or constipation. No melena or hematochezia.  GENITOURINARY: No dysuria, frequency, hematuria, or incontinence  NEUROLOGICAL: No headaches, memory loss, loss of strength, numbness, or tremors  SKIN: No itching, burning, rashes, or lesions   LYMPH Nodes: No enlarged glands  ENDOCRINE: No heat or cold intolerance; No hair loss  MUSCULOSKELETAL: No joint pain or swelling; No muscle, back, or extremity pain  PSYCHIATRIC: No depression, anxiety, mood swings, or difficulty sleeping  HEME/LYMPH: No easy bruising, or bleeding gums  ALLERGY AND IMMUNOLOGIC: No hives or eczema	    [ ] All others negative	  [ ] Unable to obtain    PHYSICAL EXAM:  T(C): 36.4 (02-02-19 @ 04:31), Max: 37.1 (02-01-19 @ 11:33)  HR: 71 (02-02-19 @ 04:31) (65 - 82)  BP: 129/88 (02-02-19 @ 04:31) (106/75 - 137/89)  RR: 18 (02-02-19 @ 04:31) (17 - 18)  SpO2: 97% (02-02-19 @ 04:31) (89% - 97%)  Wt(kg): --  I&O's Summary    01 Feb 2019 07:01  -  02 Feb 2019 07:00  --------------------------------------------------------  IN: 240 mL / OUT: 0 mL / NET: 240 mL    02 Feb 2019 07:01  -  02 Feb 2019 09:12  --------------------------------------------------------  IN: 240 mL / OUT: 0 mL / NET: 240 mL        Appearance: Normal	  HEENT:   Normal oral mucosa, PERRL, EOMI	  Lymphatic: No lymphadenopathy  Cardiovascular: Normal S1 S2, No JVD, No murmurs, No edema  Respiratory: Lungs clear to auscultation	  Psychiatry: A & O x 3, Mood & affect appropriate  Gastrointestinal:  Soft, Non-tender, + BS	  Skin: No rashes, No ecchymoses, No cyanosis	  Neurologic: Non-focal  Extremities: Normal range of motion, No clubbing, cyanosis or edema  Vascular: Peripheral pulses palpable 2+ bilaterally    MEDICATIONS  (STANDING):  amLODIPine   Tablet 5 milliGRAM(s) Oral daily  apixaban 5 milliGRAM(s) Oral every 12 hours  aspirin enteric coated 81 milliGRAM(s) Oral daily  ATENolol  Tablet 25 milliGRAM(s) Oral daily  atorvastatin 40 milliGRAM(s) Oral at bedtime  calcium acetate 667 milliGRAM(s) Oral four times a day with meals  hydrochlorothiazide 12.5 milliGRAM(s) Oral daily  losartan 100 milliGRAM(s) Oral daily  multivitamin 1 Tablet(s) Oral daily      TELEMETRY: 	    ECG:  	  RADIOLOGY:  OTHER: 	  	  LABS:	 	    CARDIAC MARKERS:                                15.9   7.46  )-----------( 275      ( 02 Feb 2019 08:24 )             46.5     02-02    139  |  100  |  19  ----------------------------<  87  3.5   |  24  |  0.69    Ca    9.0      02 Feb 2019 06:18  Mg     1.6     01-31    TPro  7.2  /  Alb  3.9  /  TBili  0.4  /  DBili  x   /  AST  22  /  ALT  16  /  AlkPhos  64  02-01    proBNP: Serum Pro-Brain Natriuretic Peptide: 825 pg/mL (01-31 @ 13:22)    Lipid Profile: Cholesterol 165  LDL 82  HDL 61      HgA1c:   TSH: Thyroid Stimulating Hormone, Serum: 1.32 uIU/mL (02-01 @ 08:53)    PT/INR - ( 31 Jan 2019 13:22 )   PT: 14.8 sec;   INR: 1.29 ratio         PTT - ( 31 Jan 2019 13:22 )  PTT:38.6 sec  < from: Cardiac Cath Lab - Adult (02.01.19 @ 18:54) >  CORONARY VESSELS: The coronary circulation is right dominant.  LM:   --  LM: Normal.  LAD:   --  Mid LAD: There was a 40 % stenosis.  CX:   --  Circumflex: Normal.  RCA:   --  Mid RCA: Angiography showed mild atherosclerosis with no flow  limiting lesions.  COMPLICATIONS: There were no complications.    < end of copied text >      Assessment and plan  ---------------------------  s/p cath  bradycardia this am asymptomatic  dc atenolol  monitor as out pt  doing well  dc home

## 2019-02-02 NOTE — DISCHARGE NOTE ADULT - MEDICATION SUMMARY - MEDICATIONS TO STOP TAKING
I will STOP taking the medications listed below when I get home from the hospital:    oxyCODONE 5 mg oral tablet  -- 1 tab(s) by mouth every 4 hours, As needed, Moderate Pain (4 - 6)    aluminum hydroxide-magnesium hydroxide 200 mg-200 mg/5 mL oral suspension  -- 30 milliliter(s) by mouth 4 times a day, As needed, Indigestion    atenolol 25 mg oral tablet  -- 1 tab(s) by mouth once a day    cholecalciferol oral tablet  -- 1000 unit(s) by mouth once a day    acetaminophen 325 mg oral tablet  -- 2 tab(s) by mouth every 6 hours, As needed, For Temp greater than 38 C (100.4 F)    diphenhydrAMINE 25 mg oral capsule  -- 1 cap(s) by mouth once a day (at bedtime), As needed, Insomnia

## 2019-02-02 NOTE — DISCHARGE NOTE ADULT - PATIENT PORTAL LINK FT
You can access the [facility] Patient Portal, offered by [facility], by registering with the following website: [email or web address]

## 2019-02-05 DIAGNOSIS — Z71.89 OTHER SPECIFIED COUNSELING: ICD-10-CM

## 2019-06-07 ENCOUNTER — OUTPATIENT (OUTPATIENT)
Dept: OUTPATIENT SERVICES | Facility: HOSPITAL | Age: 79
LOS: 1 days | End: 2019-06-07
Payer: MEDICARE

## 2019-06-07 ENCOUNTER — APPOINTMENT (OUTPATIENT)
Dept: MRI IMAGING | Facility: CLINIC | Age: 79
End: 2019-06-07
Payer: MEDICARE

## 2019-06-07 DIAGNOSIS — Z00.8 ENCOUNTER FOR OTHER GENERAL EXAMINATION: ICD-10-CM

## 2019-06-07 PROCEDURE — 72148 MRI LUMBAR SPINE W/O DYE: CPT

## 2019-06-07 PROCEDURE — 72148 MRI LUMBAR SPINE W/O DYE: CPT | Mod: 26

## 2020-02-04 ENCOUNTER — APPOINTMENT (OUTPATIENT)
Dept: ORTHOPEDIC SURGERY | Facility: CLINIC | Age: 80
End: 2020-02-04
Payer: MEDICARE

## 2020-02-04 VITALS
HEART RATE: 77 BPM | BODY MASS INDEX: 28.91 KG/M2 | WEIGHT: 134 LBS | DIASTOLIC BLOOD PRESSURE: 74 MMHG | HEIGHT: 57 IN | SYSTOLIC BLOOD PRESSURE: 111 MMHG

## 2020-02-04 DIAGNOSIS — Z98.890 OTHER SPECIFIED POSTPROCEDURAL STATES: ICD-10-CM

## 2020-02-04 DIAGNOSIS — Z87.81 OTHER SPECIFIED POSTPROCEDURAL STATES: ICD-10-CM

## 2020-02-04 DIAGNOSIS — I48.91 UNSPECIFIED ATRIAL FIBRILLATION: ICD-10-CM

## 2020-02-04 PROCEDURE — 73562 X-RAY EXAM OF KNEE 3: CPT | Mod: 50

## 2020-02-04 PROCEDURE — 99203 OFFICE O/P NEW LOW 30 MIN: CPT

## 2020-02-04 PROCEDURE — 72170 X-RAY EXAM OF PELVIS: CPT | Mod: 59

## 2020-02-04 RX ORDER — HYDROCHLOROTHIAZIDE 12.5 MG/1
TABLET ORAL
Refills: 0 | Status: ACTIVE | COMMUNITY

## 2020-02-04 RX ORDER — APIXABAN 5 MG/1
TABLET, FILM COATED ORAL
Refills: 0 | Status: ACTIVE | COMMUNITY

## 2020-02-04 RX ORDER — UBIDECARENONE/VIT E ACET 100MG-5
CAPSULE ORAL
Refills: 0 | Status: ACTIVE | COMMUNITY

## 2020-02-04 RX ORDER — LOSARTAN POTASSIUM 100 MG/1
TABLET, FILM COATED ORAL
Refills: 0 | Status: ACTIVE | COMMUNITY

## 2020-02-11 NOTE — ED PROVIDER NOTE - CROS ED GI ALL NEG
Problem: Impaired Functional Mobility  Goal: Achieve highest/safest level of mobility/gait  Description  Interventions:  - Assess patient's functional ability and stability  - Promote increasing activity/tolerance for mobility and gait  - Educate and eng negative...

## 2020-06-01 ENCOUNTER — OUTPATIENT (OUTPATIENT)
Dept: OUTPATIENT SERVICES | Facility: HOSPITAL | Age: 80
LOS: 1 days | End: 2020-06-01
Payer: MEDICARE

## 2020-06-15 PROCEDURE — G9001: CPT

## 2020-07-07 DIAGNOSIS — Z71.89 OTHER SPECIFIED COUNSELING: ICD-10-CM

## 2020-10-13 ENCOUNTER — APPOINTMENT (OUTPATIENT)
Dept: ORTHOPEDIC SURGERY | Facility: CLINIC | Age: 80
End: 2020-10-13

## 2021-06-30 NOTE — ED ADULT TRIAGE NOTE - AS TEMP SITE
CCPD completed as ordered. UF 835mls, effluent drain clear, pale yellow, no fibrin noted. See flow sheet. Report given. oral

## 2021-08-03 ENCOUNTER — APPOINTMENT (OUTPATIENT)
Dept: ORTHOPEDIC SURGERY | Facility: CLINIC | Age: 81
End: 2021-08-03
Payer: MEDICARE

## 2021-08-03 VITALS
WEIGHT: 133.7 LBS | DIASTOLIC BLOOD PRESSURE: 81 MMHG | HEIGHT: 57 IN | SYSTOLIC BLOOD PRESSURE: 132 MMHG | BODY MASS INDEX: 28.85 KG/M2 | HEART RATE: 84 BPM

## 2021-08-03 DIAGNOSIS — M25.561 PAIN IN RIGHT KNEE: ICD-10-CM

## 2021-08-03 DIAGNOSIS — M17.11 UNILATERAL PRIMARY OSTEOARTHRITIS, RIGHT KNEE: ICD-10-CM

## 2021-08-03 PROCEDURE — 99214 OFFICE O/P EST MOD 30 MIN: CPT

## 2021-08-03 PROCEDURE — 73562 X-RAY EXAM OF KNEE 3: CPT | Mod: RT

## 2021-09-29 ENCOUNTER — OUTPATIENT (OUTPATIENT)
Dept: OUTPATIENT SERVICES | Facility: HOSPITAL | Age: 81
LOS: 1 days | End: 2021-09-29
Payer: MEDICARE

## 2021-09-29 VITALS
RESPIRATION RATE: 14 BRPM | OXYGEN SATURATION: 96 % | HEIGHT: 57 IN | TEMPERATURE: 97 F | DIASTOLIC BLOOD PRESSURE: 74 MMHG | HEART RATE: 66 BPM | WEIGHT: 132.06 LBS | SYSTOLIC BLOOD PRESSURE: 125 MMHG

## 2021-09-29 DIAGNOSIS — M19.90 UNSPECIFIED OSTEOARTHRITIS, UNSPECIFIED SITE: ICD-10-CM

## 2021-09-29 DIAGNOSIS — Z01.818 ENCOUNTER FOR OTHER PREPROCEDURAL EXAMINATION: ICD-10-CM

## 2021-09-29 DIAGNOSIS — M17.11 UNILATERAL PRIMARY OSTEOARTHRITIS, RIGHT KNEE: ICD-10-CM

## 2021-09-29 DIAGNOSIS — Z98.890 OTHER SPECIFIED POSTPROCEDURAL STATES: Chronic | ICD-10-CM

## 2021-09-29 LAB
A1C WITH ESTIMATED AVERAGE GLUCOSE RESULT: 5.7 % — HIGH (ref 4–5.6)
ALBUMIN SERPL ELPH-MCNC: 3.7 G/DL — SIGNIFICANT CHANGE UP (ref 3.3–5)
ALP SERPL-CCNC: 56 U/L — SIGNIFICANT CHANGE UP (ref 30–120)
ALT FLD-CCNC: 29 U/L DA — SIGNIFICANT CHANGE UP (ref 10–60)
ANION GAP SERPL CALC-SCNC: 8 MMOL/L — SIGNIFICANT CHANGE UP (ref 5–17)
APTT BLD: 36.4 SEC — HIGH (ref 27.5–35.5)
AST SERPL-CCNC: 24 U/L — SIGNIFICANT CHANGE UP (ref 10–40)
BILIRUB SERPL-MCNC: 0.5 MG/DL — SIGNIFICANT CHANGE UP (ref 0.2–1.2)
BLD GP AB SCN SERPL QL: SIGNIFICANT CHANGE UP
BUN SERPL-MCNC: 13 MG/DL — SIGNIFICANT CHANGE UP (ref 7–23)
CALCIUM SERPL-MCNC: 8.7 MG/DL — SIGNIFICANT CHANGE UP (ref 8.4–10.5)
CHLORIDE SERPL-SCNC: 99 MMOL/L — SIGNIFICANT CHANGE UP (ref 96–108)
CO2 SERPL-SCNC: 29 MMOL/L — SIGNIFICANT CHANGE UP (ref 22–31)
CREAT SERPL-MCNC: 0.56 MG/DL — SIGNIFICANT CHANGE UP (ref 0.5–1.3)
ESTIMATED AVERAGE GLUCOSE: 117 MG/DL — HIGH (ref 68–114)
GLUCOSE SERPL-MCNC: 101 MG/DL — HIGH (ref 70–99)
HCT VFR BLD CALC: 43.2 % — SIGNIFICANT CHANGE UP (ref 34.5–45)
HGB BLD-MCNC: 15.1 G/DL — SIGNIFICANT CHANGE UP (ref 11.5–15.5)
INR BLD: 1.18 RATIO — HIGH (ref 0.88–1.16)
MCHC RBC-ENTMCNC: 34.2 PG — HIGH (ref 27–34)
MCHC RBC-ENTMCNC: 35 GM/DL — SIGNIFICANT CHANGE UP (ref 32–36)
MCV RBC AUTO: 98 FL — SIGNIFICANT CHANGE UP (ref 80–100)
MRSA PCR RESULT.: SIGNIFICANT CHANGE UP
NRBC # BLD: 0 /100 WBCS — SIGNIFICANT CHANGE UP (ref 0–0)
PLATELET # BLD AUTO: 265 K/UL — SIGNIFICANT CHANGE UP (ref 150–400)
POTASSIUM SERPL-MCNC: 4 MMOL/L — SIGNIFICANT CHANGE UP (ref 3.5–5.3)
POTASSIUM SERPL-SCNC: 4 MMOL/L — SIGNIFICANT CHANGE UP (ref 3.5–5.3)
PROT SERPL-MCNC: 7.7 G/DL — SIGNIFICANT CHANGE UP (ref 6–8.3)
PROTHROM AB SERPL-ACNC: 14.2 SEC — HIGH (ref 10.6–13.6)
RBC # BLD: 4.41 M/UL — SIGNIFICANT CHANGE UP (ref 3.8–5.2)
RBC # FLD: 12.6 % — SIGNIFICANT CHANGE UP (ref 10.3–14.5)
S AUREUS DNA NOSE QL NAA+PROBE: SIGNIFICANT CHANGE UP
SODIUM SERPL-SCNC: 136 MMOL/L — SIGNIFICANT CHANGE UP (ref 135–145)
WBC # BLD: 8.4 K/UL — SIGNIFICANT CHANGE UP (ref 3.8–10.5)
WBC # FLD AUTO: 8.4 K/UL — SIGNIFICANT CHANGE UP (ref 3.8–10.5)

## 2021-09-29 PROCEDURE — 93005 ELECTROCARDIOGRAM TRACING: CPT

## 2021-09-29 PROCEDURE — 93010 ELECTROCARDIOGRAM REPORT: CPT

## 2021-09-29 PROCEDURE — G0463: CPT

## 2021-09-29 RX ORDER — SENNA PLUS 8.6 MG/1
1 TABLET ORAL
Qty: 0 | Refills: 0 | DISCHARGE

## 2021-09-29 RX ORDER — ESOMEPRAZOLE MAGNESIUM 40 MG/1
1 CAPSULE, DELAYED RELEASE ORAL
Qty: 0 | Refills: 0 | DISCHARGE

## 2021-09-29 RX ORDER — DOCUSATE SODIUM 100 MG
1 CAPSULE ORAL
Qty: 0 | Refills: 0 | DISCHARGE

## 2021-09-29 RX ORDER — APIXABAN 2.5 MG/1
1 TABLET, FILM COATED ORAL
Qty: 0 | Refills: 0 | DISCHARGE

## 2021-09-29 NOTE — H&P PST ADULT - PROBLEM SELECTOR PLAN 1
Right knee replacement on 10/11/21   Medical and cardiac clearance   Pre op instructions   COVID test on 10/8/21 at 9 am   Abnormal EKG ; Afib, T wave inversion in anterolateral leads patient scheduled for stress and Echo on Monday Right knee replacement on 10/11/21   Medical and cardiac clearance   Pre op instructions   Follow up with cardiologist on Eliquis instruction for upcoming surgery.    COVID test on 10/8/21 at 9 am   Abnormal EKG ; Afib, T wave inversion in anterolateral leads patient scheduled for stress and Echo on Monday

## 2021-09-29 NOTE — H&P PST ADULT - HISTORY OF PRESENT ILLNESS
This is a 80 y/o farsi speaking  female with long standing history of progressively worsening right knee pain and pain with walking and daily activities. Reports intermittent pain and pain exacerbates with walking and steps with pain scale 10/10 . Tylenol PRN for pain . currently walking with walker. scheduled for right knee replacement on 10/11/21

## 2021-09-29 NOTE — H&P PST ADULT - NSICDXPASTMEDICALHX_GEN_ALL_CORE_FT
PAST MEDICAL HISTORY:  Afib on Eliquis    Cataract     COVID-19 vaccine series completed moderna 3/2021    GERD (gastroesophageal reflux disease)     HLD (hyperlipidemia)     HTN (hypertension)     Neuropathy     Osteoarthritis bilateral knee

## 2021-10-05 PROBLEM — K21.9 GASTRO-ESOPHAGEAL REFLUX DISEASE WITHOUT ESOPHAGITIS: Chronic | Status: ACTIVE | Noted: 2021-09-29

## 2021-10-05 PROBLEM — M19.90 UNSPECIFIED OSTEOARTHRITIS, UNSPECIFIED SITE: Chronic | Status: ACTIVE | Noted: 2021-09-29

## 2021-10-05 PROBLEM — H26.9 UNSPECIFIED CATARACT: Chronic | Status: ACTIVE | Noted: 2021-09-29

## 2021-10-05 PROBLEM — I48.91 UNSPECIFIED ATRIAL FIBRILLATION: Chronic | Status: ACTIVE | Noted: 2021-09-29

## 2021-10-05 PROBLEM — E78.5 HYPERLIPIDEMIA, UNSPECIFIED: Chronic | Status: ACTIVE | Noted: 2021-09-29

## 2021-10-05 PROBLEM — I10 ESSENTIAL (PRIMARY) HYPERTENSION: Chronic | Status: ACTIVE | Noted: 2021-09-29

## 2021-10-05 PROBLEM — G62.9 POLYNEUROPATHY, UNSPECIFIED: Chronic | Status: ACTIVE | Noted: 2021-09-29

## 2021-10-05 PROBLEM — Z92.29 PERSONAL HISTORY OF OTHER DRUG THERAPY: Chronic | Status: ACTIVE | Noted: 2021-09-29

## 2021-10-08 ENCOUNTER — OUTPATIENT (OUTPATIENT)
Dept: OUTPATIENT SERVICES | Facility: HOSPITAL | Age: 81
LOS: 1 days | End: 2021-10-08

## 2021-10-08 DIAGNOSIS — M17.11 UNILATERAL PRIMARY OSTEOARTHRITIS, RIGHT KNEE: ICD-10-CM

## 2021-10-08 DIAGNOSIS — Z01.818 ENCOUNTER FOR OTHER PREPROCEDURAL EXAMINATION: ICD-10-CM

## 2021-10-08 DIAGNOSIS — Z98.890 OTHER SPECIFIED POSTPROCEDURAL STATES: Chronic | ICD-10-CM

## 2021-10-08 DIAGNOSIS — Z20.828 CONTACT WITH AND (SUSPECTED) EXPOSURE TO OTHER VIRAL COMMUNICABLE DISEASES: ICD-10-CM

## 2021-10-08 LAB — SARS-COV-2 RNA SPEC QL NAA+PROBE: SIGNIFICANT CHANGE UP

## 2021-10-11 ENCOUNTER — TRANSCRIPTION ENCOUNTER (OUTPATIENT)
Age: 81
End: 2021-10-11

## 2021-10-11 ENCOUNTER — APPOINTMENT (OUTPATIENT)
Dept: ORTHOPEDIC SURGERY | Facility: HOSPITAL | Age: 81
End: 2021-10-11

## 2021-10-11 ENCOUNTER — INPATIENT (INPATIENT)
Facility: HOSPITAL | Age: 81
LOS: 3 days | Discharge: HOME | DRG: 470 | End: 2021-10-15
Attending: ORTHOPAEDIC SURGERY | Admitting: ORTHOPAEDIC SURGERY
Payer: MEDICARE

## 2021-10-11 ENCOUNTER — RESULT REVIEW (OUTPATIENT)
Age: 81
End: 2021-10-11

## 2021-10-11 VITALS
TEMPERATURE: 99 F | WEIGHT: 131.18 LBS | HEIGHT: 57 IN | SYSTOLIC BLOOD PRESSURE: 132 MMHG | OXYGEN SATURATION: 99 % | HEART RATE: 80 BPM | RESPIRATION RATE: 18 BRPM | DIASTOLIC BLOOD PRESSURE: 78 MMHG

## 2021-10-11 DIAGNOSIS — Z98.890 OTHER SPECIFIED POSTPROCEDURAL STATES: Chronic | ICD-10-CM

## 2021-10-11 DIAGNOSIS — M17.11 UNILATERAL PRIMARY OSTEOARTHRITIS, RIGHT KNEE: ICD-10-CM

## 2021-10-11 DIAGNOSIS — Z01.818 ENCOUNTER FOR OTHER PREPROCEDURAL EXAMINATION: ICD-10-CM

## 2021-10-11 LAB
ABO RH CONFIRMATION: SIGNIFICANT CHANGE UP
ANION GAP SERPL CALC-SCNC: 8 MMOL/L — SIGNIFICANT CHANGE UP (ref 5–17)
APTT BLD: 32.1 SEC — SIGNIFICANT CHANGE UP (ref 27.5–35.5)
BUN SERPL-MCNC: 12 MG/DL — SIGNIFICANT CHANGE UP (ref 7–23)
CALCIUM SERPL-MCNC: 8.6 MG/DL — SIGNIFICANT CHANGE UP (ref 8.4–10.5)
CHLORIDE SERPL-SCNC: 98 MMOL/L — SIGNIFICANT CHANGE UP (ref 96–108)
CO2 SERPL-SCNC: 28 MMOL/L — SIGNIFICANT CHANGE UP (ref 22–31)
CREAT SERPL-MCNC: 0.78 MG/DL — SIGNIFICANT CHANGE UP (ref 0.5–1.3)
GLUCOSE SERPL-MCNC: 167 MG/DL — HIGH (ref 70–99)
HCT VFR BLD CALC: 41.6 % — SIGNIFICANT CHANGE UP (ref 34.5–45)
HGB BLD-MCNC: 14.4 G/DL — SIGNIFICANT CHANGE UP (ref 11.5–15.5)
INR BLD: 1 RATIO — SIGNIFICANT CHANGE UP (ref 0.88–1.16)
POTASSIUM SERPL-MCNC: 4.4 MMOL/L — SIGNIFICANT CHANGE UP (ref 3.5–5.3)
POTASSIUM SERPL-SCNC: 4.4 MMOL/L — SIGNIFICANT CHANGE UP (ref 3.5–5.3)
PROTHROM AB SERPL-ACNC: 12.1 SEC — SIGNIFICANT CHANGE UP (ref 10.6–13.6)
SODIUM SERPL-SCNC: 134 MMOL/L — LOW (ref 135–145)

## 2021-10-11 PROCEDURE — 99223 1ST HOSP IP/OBS HIGH 75: CPT

## 2021-10-11 PROCEDURE — 27447 TOTAL KNEE ARTHROPLASTY: CPT | Mod: RT

## 2021-10-11 PROCEDURE — 93010 ELECTROCARDIOGRAM REPORT: CPT

## 2021-10-11 PROCEDURE — 73560 X-RAY EXAM OF KNEE 1 OR 2: CPT | Mod: 26,RT

## 2021-10-11 PROCEDURE — 88305 TISSUE EXAM BY PATHOLOGIST: CPT | Mod: 26

## 2021-10-11 PROCEDURE — 88311 DECALCIFY TISSUE: CPT | Mod: 26

## 2021-10-11 RX ORDER — POLYETHYLENE GLYCOL 3350 17 G/17G
17 POWDER, FOR SOLUTION ORAL AT BEDTIME
Refills: 0 | Status: DISCONTINUED | OUTPATIENT
Start: 2021-10-11 | End: 2021-10-15

## 2021-10-11 RX ORDER — SODIUM CHLORIDE 9 MG/ML
1000 INJECTION, SOLUTION INTRAVENOUS
Refills: 0 | Status: DISCONTINUED | OUTPATIENT
Start: 2021-10-11 | End: 2021-10-15

## 2021-10-11 RX ORDER — ACETAMINOPHEN 500 MG
1000 TABLET ORAL ONCE
Refills: 0 | Status: COMPLETED | OUTPATIENT
Start: 2021-10-12 | End: 2021-10-12

## 2021-10-11 RX ORDER — TRANEXAMIC ACID 100 MG/ML
1000 INJECTION, SOLUTION INTRAVENOUS ONCE
Refills: 0 | Status: COMPLETED | OUTPATIENT
Start: 2021-10-11 | End: 2021-10-11

## 2021-10-11 RX ORDER — AMLODIPINE BESYLATE 2.5 MG/1
1 TABLET ORAL
Qty: 0 | Refills: 0 | DISCHARGE

## 2021-10-11 RX ORDER — LATANOPROST 0.05 MG/ML
1 SOLUTION/ DROPS OPHTHALMIC; TOPICAL
Qty: 0 | Refills: 0 | DISCHARGE

## 2021-10-11 RX ORDER — ONDANSETRON 8 MG/1
4 TABLET, FILM COATED ORAL ONCE
Refills: 0 | Status: DISCONTINUED | OUTPATIENT
Start: 2021-10-11 | End: 2021-10-11

## 2021-10-11 RX ORDER — ACETAMINOPHEN 500 MG
1000 TABLET ORAL ONCE
Refills: 0 | Status: COMPLETED | OUTPATIENT
Start: 2021-10-11 | End: 2021-10-11

## 2021-10-11 RX ORDER — HYDROMORPHONE HYDROCHLORIDE 2 MG/ML
0.5 INJECTION INTRAMUSCULAR; INTRAVENOUS; SUBCUTANEOUS
Refills: 0 | Status: DISCONTINUED | OUTPATIENT
Start: 2021-10-11 | End: 2021-10-15

## 2021-10-11 RX ORDER — LOSARTAN POTASSIUM 100 MG/1
100 TABLET, FILM COATED ORAL DAILY
Refills: 0 | Status: DISCONTINUED | OUTPATIENT
Start: 2021-10-13 | End: 2021-10-15

## 2021-10-11 RX ORDER — CEFAZOLIN SODIUM 1 G
2000 VIAL (EA) INJECTION EVERY 8 HOURS
Refills: 0 | Status: COMPLETED | OUTPATIENT
Start: 2021-10-11 | End: 2021-10-12

## 2021-10-11 RX ORDER — ACETAMINOPHEN 500 MG
1000 TABLET ORAL EVERY 8 HOURS
Refills: 0 | Status: DISCONTINUED | OUTPATIENT
Start: 2021-10-12 | End: 2021-10-15

## 2021-10-11 RX ORDER — AMLODIPINE BESYLATE 2.5 MG/1
5 TABLET ORAL DAILY
Refills: 0 | Status: DISCONTINUED | OUTPATIENT
Start: 2021-10-13 | End: 2021-10-15

## 2021-10-11 RX ORDER — OXYCODONE HYDROCHLORIDE 5 MG/1
10 TABLET ORAL
Refills: 0 | Status: DISCONTINUED | OUTPATIENT
Start: 2021-10-11 | End: 2021-10-15

## 2021-10-11 RX ORDER — CEFAZOLIN SODIUM 1 G
2000 VIAL (EA) INJECTION ONCE
Refills: 0 | Status: COMPLETED | OUTPATIENT
Start: 2021-10-11 | End: 2021-10-11

## 2021-10-11 RX ORDER — SODIUM CHLORIDE 9 MG/ML
1000 INJECTION, SOLUTION INTRAVENOUS
Refills: 0 | Status: DISCONTINUED | OUTPATIENT
Start: 2021-10-11 | End: 2021-10-11

## 2021-10-11 RX ORDER — ROSUVASTATIN CALCIUM 5 MG/1
0 TABLET ORAL
Qty: 0 | Refills: 0 | DISCHARGE

## 2021-10-11 RX ORDER — SODIUM CHLORIDE 9 MG/ML
500 INJECTION INTRAMUSCULAR; INTRAVENOUS; SUBCUTANEOUS ONCE
Refills: 0 | Status: COMPLETED | OUTPATIENT
Start: 2021-10-11 | End: 2021-10-11

## 2021-10-11 RX ORDER — CELECOXIB 200 MG/1
100 CAPSULE ORAL EVERY 12 HOURS
Refills: 0 | Status: DISCONTINUED | OUTPATIENT
Start: 2021-10-11 | End: 2021-10-15

## 2021-10-11 RX ORDER — PANTOPRAZOLE SODIUM 20 MG/1
40 TABLET, DELAYED RELEASE ORAL
Refills: 0 | Status: DISCONTINUED | OUTPATIENT
Start: 2021-10-11 | End: 2021-10-15

## 2021-10-11 RX ORDER — MAGNESIUM HYDROXIDE 400 MG/1
30 TABLET, CHEWABLE ORAL DAILY
Refills: 0 | Status: DISCONTINUED | OUTPATIENT
Start: 2021-10-11 | End: 2021-10-15

## 2021-10-11 RX ORDER — SENNA PLUS 8.6 MG/1
2 TABLET ORAL AT BEDTIME
Refills: 0 | Status: DISCONTINUED | OUTPATIENT
Start: 2021-10-11 | End: 2021-10-15

## 2021-10-11 RX ORDER — CELECOXIB 200 MG/1
1 CAPSULE ORAL
Qty: 60 | Refills: 0
Start: 2021-10-11 | End: 2021-11-09

## 2021-10-11 RX ORDER — LATANOPROST 0.05 MG/ML
1 SOLUTION/ DROPS OPHTHALMIC; TOPICAL AT BEDTIME
Refills: 0 | Status: DISCONTINUED | OUTPATIENT
Start: 2021-10-11 | End: 2021-10-15

## 2021-10-11 RX ORDER — CHLORHEXIDINE GLUCONATE 213 G/1000ML
1 SOLUTION TOPICAL ONCE
Refills: 0 | Status: COMPLETED | OUTPATIENT
Start: 2021-10-11 | End: 2021-10-11

## 2021-10-11 RX ORDER — ERGOCALCIFEROL 1.25 MG/1
1 CAPSULE ORAL
Qty: 0 | Refills: 0 | DISCHARGE

## 2021-10-11 RX ORDER — APREPITANT 80 MG/1
40 CAPSULE ORAL ONCE
Refills: 0 | Status: COMPLETED | OUTPATIENT
Start: 2021-10-11 | End: 2021-10-11

## 2021-10-11 RX ORDER — APIXABAN 2.5 MG/1
2.5 TABLET, FILM COATED ORAL
Refills: 0 | Status: COMPLETED | OUTPATIENT
Start: 2021-10-12 | End: 2021-10-12

## 2021-10-11 RX ORDER — HYDROMORPHONE HYDROCHLORIDE 2 MG/ML
0.25 INJECTION INTRAMUSCULAR; INTRAVENOUS; SUBCUTANEOUS
Refills: 0 | Status: DISCONTINUED | OUTPATIENT
Start: 2021-10-11 | End: 2021-10-11

## 2021-10-11 RX ORDER — OXYCODONE HYDROCHLORIDE 5 MG/1
5 TABLET ORAL
Refills: 0 | Status: DISCONTINUED | OUTPATIENT
Start: 2021-10-11 | End: 2021-10-15

## 2021-10-11 RX ORDER — ESOMEPRAZOLE MAGNESIUM 40 MG/1
1 CAPSULE, DELAYED RELEASE ORAL
Qty: 0 | Refills: 0 | DISCHARGE

## 2021-10-11 RX ORDER — ATORVASTATIN CALCIUM 80 MG/1
80 TABLET, FILM COATED ORAL AT BEDTIME
Refills: 0 | Status: DISCONTINUED | OUTPATIENT
Start: 2021-10-11 | End: 2021-10-15

## 2021-10-11 RX ADMIN — SENNA PLUS 2 TABLET(S): 8.6 TABLET ORAL at 20:19

## 2021-10-11 RX ADMIN — SODIUM CHLORIDE 1000 MILLILITER(S): 9 INJECTION INTRAMUSCULAR; INTRAVENOUS; SUBCUTANEOUS at 15:00

## 2021-10-11 RX ADMIN — SODIUM CHLORIDE 75 MILLILITER(S): 9 INJECTION, SOLUTION INTRAVENOUS at 15:00

## 2021-10-11 RX ADMIN — ATORVASTATIN CALCIUM 80 MILLIGRAM(S): 80 TABLET, FILM COATED ORAL at 20:19

## 2021-10-11 RX ADMIN — SODIUM CHLORIDE 500 MILLILITER(S): 9 INJECTION INTRAMUSCULAR; INTRAVENOUS; SUBCUTANEOUS at 18:45

## 2021-10-11 RX ADMIN — SODIUM CHLORIDE 100 MILLILITER(S): 9 INJECTION, SOLUTION INTRAVENOUS at 18:17

## 2021-10-11 RX ADMIN — Medication 100 MILLIGRAM(S): at 20:19

## 2021-10-11 RX ADMIN — APREPITANT 40 MILLIGRAM(S): 80 CAPSULE ORAL at 10:52

## 2021-10-11 RX ADMIN — SODIUM CHLORIDE 100 MILLILITER(S): 9 INJECTION, SOLUTION INTRAVENOUS at 20:19

## 2021-10-11 RX ADMIN — Medication 1000 MILLIGRAM(S): at 19:00

## 2021-10-11 RX ADMIN — Medication 400 MILLIGRAM(S): at 18:45

## 2021-10-11 RX ADMIN — LATANOPROST 1 DROP(S): 0.05 SOLUTION/ DROPS OPHTHALMIC; TOPICAL at 20:19

## 2021-10-11 RX ADMIN — CHLORHEXIDINE GLUCONATE 1 APPLICATION(S): 213 SOLUTION TOPICAL at 10:52

## 2021-10-11 RX ADMIN — CELECOXIB 100 MILLIGRAM(S): 200 CAPSULE ORAL at 20:19

## 2021-10-11 RX ADMIN — Medication 50 MILLIGRAM(S): at 18:16

## 2021-10-11 NOTE — DISCHARGE NOTE PROVIDER - NSDCCPTREATMENT_GEN_ALL_CORE_FT
PRINCIPAL PROCEDURE  Procedure: Right total knee replacement  Findings and Treatment:        PRINCIPAL PROCEDURE  Procedure: Right total knee replacement  Findings and Treatment: osteoarthritis right knee

## 2021-10-11 NOTE — DISCHARGE NOTE PROVIDER - CARE PROVIDER_API CALL
Osvaldo Soliz)  Orthopedics  [address], [address]  [place] [address]  Phone: [phone]  Fax: [phone]  Scheduled Appointment: 10/25/2021 08:40 AM

## 2021-10-11 NOTE — CONSULT NOTE ADULT - SUBJECTIVE AND OBJECTIVE BOX
HPI: 81F Atrial Fibrillation, HTN, HLD, GERD and OA  has been combatting pain in right knee for several years which has progressively worsened.  Patient has tried multiple options for pain relief including OTC medication and as well as PT with minimal relief and has undergone elective replacement of right knee successfully.  Patient is currently resting in bed comfortable with good pain control.     REVIEW OF SYSTEMS:  CONSTITUTIONAL: No fever, weight loss, or fatigue  EYES: No eye pain, visual disturbances, or discharge  ENMT:  No difficulty hearing, tinnitus, vertigo; No sinus or throat pain  NECK: No pain or stiffness  RESPIRATORY: No cough, wheezing, chills or hemoptysis; No shortness of breath  CARDIOVASCULAR: No chest pain, palpitations, dizziness, or leg swelling  GASTROINTESTINAL: No abdominal or epigastric pain. No nausea, vomiting, or hematemesis; No diarrhea or constipation. No melena or hematochezia.  GENITOURINARY: No dysuria, frequency, hematuria, or incontinence  NEUROLOGICAL: No headaches, memory loss, loss of strength, numbness, or tremors  MUSCULOSKELETAL: No muscle or back pain      PAST MEDICAL & SURGICAL HISTORY:  HTN (hypertension)    Afib  on Eliquis    Neuropathy    HLD (hyperlipidemia)    GERD (gastroesophageal reflux disease)    Osteoarthritis  bilateral knee    COVID-19 vaccine series completed  moderna 3/2021    Cataract    S/P ORIF (open reduction internal fixation) fracture  Right hip 2017        SOCIAL HISTORY:  Tobacco; EtOH; Illicit Drugs    Allergies    aspirin (Other)    Intolerances        MEDICATIONS  (STANDING):  lactated ringers. 1000 milliLiter(s) (75 mL/Hr) IV Continuous <Continuous>    MEDICATIONS  (PRN):  HYDROmorphone  Injectable 0.25 milliGRAM(s) IV Push every 10 minutes PRN Moderate Pain (4 - 6)  ondansetron Injectable 4 milliGRAM(s) IV Push once PRN Nausea and/or Vomiting      FAMILY HISTORY:      Vital Signs Last 24 Hrs  T(C): 36.4 (11 Oct 2021 16:30), Max: 37.1 (11 Oct 2021 10:26)  T(F): 97.5 (11 Oct 2021 16:30), Max: 98.7 (11 Oct 2021 10:26)  HR: 67 (11 Oct 2021 16:30) (66 - 87)  BP: 104/64 (11 Oct 2021 16:30) (91/62 - 132/78)  BP(mean): --  RR: 20 (11 Oct 2021 16:30) (14 - 20)  SpO2: 97% (11 Oct 2021 16:30) (90% - 99%)    PHYSICAL EXAM:    GENERAL: NAD, well-developed  HEAD:  Atraumatic, Normocephalic  EYES: EOMI, PERRLA, conjunctiva and sclera clear  ENMT: No tonsillar erythema, exudates, or enlargement; Moist mucous membranes, Good dentition, No lesions  NECK: Supple, No JVD, Normal thyroid  NERVOUS SYSTEM:  Alert & Oriented X3, Good concentration;  CHEST/LUNG: Clear to auscultation bilaterally; No rales, rhonchi, wheezing, or rubs  HEART: Regular rate and rhythm; No murmurs, rubs, or gallops  ABDOMEN: Soft, Nontender, Nondistended; Bowel sounds present  EXTREMITIES:  2+ Peripheral Pulses, No clubbing, cyanosis, or edema      LABS:          PT/INR - ( 11 Oct 2021 10:48 )   PT: 12.1 sec;   INR: 1.00 ratio         PTT - ( 11 Oct 2021 11:06 )  PTT:32.1 sec    CAPILLARY BLOOD GLUCOSE          RADIOLOGY & ADDITIONAL STUDIES:    EKG:   Personally Reviewed:  [ ] YES     Imaging:   Personally Reviewed:  [ ] YES     Consultant(s) Notes Reviewed:      Care Discussed with Consultants/Other Providers:

## 2021-10-11 NOTE — BRIEF OPERATIVE NOTE - NSICDXBRIEFPOSTOP_GEN_ALL_CORE_FT
POST-OP DIAGNOSIS:  DJD (degenerative joint disease) of knee 11-Oct-2021 14:14:09 Right Javon Segal

## 2021-10-11 NOTE — PHYSICAL THERAPY INITIAL EVALUATION ADULT - ADL SKILLS, REHAB EVAL
Subjective:   Patient is seen in follow-up.  The patient is not providing history.  He was followed earlier to be incontinent to urine and stool.  He was nodding his head.    SUMMARY:  History of Present Illness:  Indio Edwards is a 64 year old male patient with past medical history of essential hypertension, necrotizing pancreatitis, sleep apnea, HLD, GERD and osteoporosis presented with altered mental state.  The patient is aphasic and unable to provide history and therefore history was obtained from review of electronic medical records.  Per EMS the patient's roommate noted that the patient has been sitting on couch since Saturday 9/25.  He felt that he was intoxicated.  However since he did not wake up he called EMS.  Upon arrival of [facility] the patient was hypertensive and was found incontinent to both urine and stool.  In ED he was hypertensive with /105, P 102, T 98.6°.  NIH was 29. He has failed dysphagia screen.  CT head was negative for acute bleed but showed several areas of low signal density suggestive of acute infarct.  He was not candidate for tPA as the last well known time was more than 4.5 hours.  He was also not candidate for intervention as last well known time was 3 days ago.    • LORazepam  1 mg Intravenous Once   • aspirin  300 mg Rectal Daily   • [Held by provider] amLODIPine  5 mg Oral Daily   • [Held by provider] atorvastatin  80 mg Oral Nightly   • sodium chloride (PF)  2 mL Intracatheter 2 times per day   • enoxaparin  40 mg Subcutaneous Daily       • dextrose 5 % / sodium chloride 0.45% infusion 75 mL/hr at 09/29/21 1532       Past medical history, surgical history, family history and social history were reviewed and have not changed and documented in the previous consultation.    Review of Systems:  Review of systems could not be obtained due to aphasia.      Objective:  Well developed, well nourished, in no acute distress.    Visit Vitals  BP (!) 164/100 (BP Location: LUE - Left upper  extremity, Patient Position: Supine)   Pulse 90   Temp 98.4 °F (36.9 °C) (Oral)   Resp 16   Ht 5' 4\" (1.626 m)   Wt 76.4 kg (168 lb 6.9 oz)   SpO2 95%   BMI 28.91 kg/m²       Cardiovascular: Regular S1 and S2. No murmur.    NEUROLOGICAL EXAMINATION:  The patient is awake but aphasic.  He did not answer any of the questions.  He does not follow commands.  Pupils are equal and reactive to light.  He has left gaze preference.  He blinks to threat more in the left temporal but not in the right temporal visual fields.  Sensory examination on the face was attempted but there was no objective response.  There is asymmetry of right nasolabial fold.  Muscle bulk and tone are normal.  He has right hemiparesis.  Deep tendon reflexes are brisker on the right than the left.  Plantar response is upgoing on the right and downgoing on the left.  Sensory examination, coordination and gait could not be examined.    Lab Results   Component Value Date    SODIUM 136 09/27/2021    POTASSIUM 4.5 09/27/2021    GLUCOSE 88 09/27/2021    BUN 13 09/27/2021    CREATININE 0.60 (L) 09/27/2021    CALCIUM 9.5 09/27/2021    MG 1.8 01/06/2017    BILIRUBIN 1.1 (H) 09/27/2021    AST 77 (H) 09/27/2021     (H) 09/27/2021    ALBUMIN 4.3 09/27/2021     (H) 09/28/2021    PT 15.0 (H) 09/27/2021    INR 1.5 09/27/2021    PTT 27 01/10/2017    RBC 6.34 (H) 09/28/2021    WBC 9.5 09/28/2021    HGB 18.2 (H) 09/28/2021    HCT 52.9 (H) 09/28/2021     09/28/2021    RESR 17 (H) 01/23/2017    CRP 0.4 01/23/2017     Carbon Dioxide (mmol/L)   Date Value   09/27/2021 20 (L)       Echocardiogram:  Moderately increased left ventricular wall thickness. LVEF = 63 %. No regional wall motion abnormalities. LV GLS 16  % .  Grade I/IV diastolic dysfunction (abnormal relaxation filling pattern), normal to mildly elevated filling pressures.  Normal right ventricular size. Mildly decreased right ventricular systolic function. PA systolic pressure 2430  mmHg.  No  significant valve abnormalities.  No pericardial effusion.  Agitated saline was injected through a peripheral vein and did not show evidence of a shunt.  Definity contrast was utilized to better visualize the endocardial definition.  No prior study available for comparison.    Neuroimaging:  MRI Brain:  IMPRESSION:       1.  Moderate to large evolving infarct in the left MCA territory, as   described. There is associated sulcal effacement without mass effect. No   hemorrhage. Continued follow-up CT is recommended.   2.  Moderate chronic small vessel ischemic disease.         CTA Head & neck:  IMPRESSION:   CTA HEAD:   1. There is tapered occlusion of the proximal aspect of the superior M2   branch of the left MCA. There is filling of the more distal territory of   this M2 branch by collateral flow which is more faintly opacified than that   seen on the right.   2. There is high-grade stenosis involving the right P2 segment. There is   fetal origin of the right PCA.   3. There is 20-30% stenosis involving the distal right M1 segment and   involving the left P2 segment.     CTA NECK:   1. There is no appreciable stenosis involving either carotid bulb/proximal   ICA.   2. There is 50% stenosis at the junction of the right V3 and V4 segments.   There is mild narrowing of the right V4 segment. The left vertebral artery   is widely patent.   3. There is high-grade stenosis involving the proximal right PICA.     CT HEAD:   1. Redemonstrated are multifocal areas of recent ischemia throughout the   left MCA distribution, as detailed above.   2. There are findings compatible with moderately advanced chronic   microvascular ischemia with superimposed recent/acute ischemia not   excluded.     CT NECK:   1. Moderately advanced centrilobular emphysema.   2. There is moderate dilatation of the visualized intrathoracic esophagus      Assessment and Plan:  This is a 64-year-old male patient with past medical history of HTN was  admitted with aphasia and right hemiparesis.  The patient is unable to provide history.  On examination he is aphasic with left gaze preference and has dense right hemiplegia.  CT head and MRI and CTA were reviewed.  MRI showed moderate to large evolving infarct involving left MCA territory.  CTA showed near occlusion of left M2 and other severe atherosclerotic changes intracranially.  The patient is on aspirin and statin.  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  Echocardiogram is negative for evidence of shunt.  EKG did not show atrial fibrillation.  At this time there is no role for anticoagulation and continue with aspirin, statin.  DVT prophylaxis.  Due to extent of deficit most likely the patient will need subacute nursing placement as he is not able to participate in intense inpatient rehabilitation.      Rubina Calle MD   independent

## 2021-10-11 NOTE — DISCHARGE NOTE NURSING/CASE MANAGEMENT/SOCIAL WORK - NSSCNAMETXT_GEN_ALL_CORE
[facility] [place] - [phone] or [phone]  PT to visit the day after hospital discharge; RN to follow if needed. Please contact the home care agency if you have not heard from them by 12 noon on the day after your hospital discharge.   [facility] (formerly [facility])   [address] [place] [address]

## 2021-10-11 NOTE — DISCHARGE NOTE PROVIDER - NSDCFUADDAPPT_GEN_ALL_CORE_FT
It is advised that you follow up with your PCP within 2-3 weeks of discharge home from the hospital/rehab It is advised that you follow up with your PCP within 2-3 weeks of discharge home from the hospital

## 2021-10-11 NOTE — DISCHARGE NOTE PROVIDER - HOSPITAL COURSE
This patient was admitted to [facility] with a history of severe degenerative joint disease of the right total knee replacement.  Patient went to Pre-Surgical Testing at [facility] and was medically cleared to undergo a right total knee replacement by Dr. Soliz on 10/11/21.  No operative or sage-operative complications arose during patients hospital course.  Patient received antibiotic according to SCIP guidelines for infection prevention.  Eliquis was given for DVT prophylaxis.  Anesthesia, Medical Hospitalist, Physical Therapy and Occupational Therapy were consulted. Patient is stable for discharge with a good prognosis.  Appropriate discharge instructions and medications are provided in this document.

## 2021-10-11 NOTE — PHYSICAL THERAPY INITIAL EVALUATION ADULT - ADDITIONAL COMMENTS
pt lives alone, has a few GISELLA with HR. No steps inside apartment. Family has been working on setting up home health aides. Pt needs RW and cane. Has stall shower.

## 2021-10-11 NOTE — DISCHARGE NOTE PROVIDER - NSDCMRMEDTOKEN_GEN_ALL_CORE_FT
acetaminophen 500 mg oral tablet: 1 tab(s) orally 3 times a day, As Needed  amLODIPine 5 mg oral tablet: 1 tab(s) orally once a day  calcium acetate 667 mg oral tablet: 1 tab(s) orally once a day   Eliquis 5 mg oral tablet: 1 tab(s) orally 2 times a day  esomeprazole 40 mg oral delayed release capsule: 1 cap(s) orally once a day  hydroCHLOROthiazide 12.5 mg oral capsule: 1 cap(s) orally once a day  latanoprost 0.005% ophthalmic solution: 1 drop(s) to each affected eye once a day (in the evening)  losartan 100 mg oral tablet: 1 tab(s) orally once a day  Multiple Vitamins oral tablet: 1 tab(s) orally once a day  pregabalin 50 mg oral capsule: 1 cap(s) orally 2 times a day  rosuvastatin 20 mg oral tablet: orally once a day (at bedtime)  Vitamin D2 50,000 intl units (1.25 mg) oral capsule (obsolete): 1 cap(s) orally once a week   acetaminophen 500 mg oral tablet: 1 tab(s) orally 3 times a day, As Needed  amLODIPine 5 mg oral tablet: 1 tab(s) orally once a day  calcium acetate 667 mg oral tablet: 1 tab(s) orally once a day   celecoxib 100 mg oral capsule: 1 cap orally every 12 hours. Take 2 hours after Aspirin.    Eliquis 5 mg oral tablet: 1 tab(s) orally 2 times a day  esomeprazole 40 mg oral delayed release capsule: 1 cap(s) orally once a day  hydroCHLOROthiazide 12.5 mg oral capsule: 1 cap(s) orally once a day  latanoprost 0.005% ophthalmic solution: 1 drop(s) to each affected eye once a day (in the evening)  losartan 100 mg oral tablet: 1 tab(s) orally once a day  Multiple Vitamins oral tablet: 1 tab(s) orally once a day  pregabalin 50 mg oral capsule: 1 cap(s) orally 2 times a day  rosuvastatin 20 mg oral tablet: orally once a day (at bedtime)  Vitamin D2 50,000 intl units (1.25 mg) oral capsule (obsolete): 1 cap(s) orally once a week   amLODIPine 5 mg oral tablet: 1 tab(s) orally once a day  calcium acetate 667 mg oral tablet: 1 tab(s) orally once a day   celecoxib 100 mg oral capsule: 1 cap orally every 12 hours. Take 2 hours after Aspirin.    Eliquis 5 mg oral tablet: 1 tab(s) orally 2 times a day  esomeprazole 40 mg oral delayed release capsule: 1 cap(s) orally once a day  hydroCHLOROthiazide 12.5 mg oral capsule: 1 cap(s) orally once a day  latanoprost 0.005% ophthalmic solution: 1 drop(s) to each affected eye once a day (in the evening)  losartan 100 mg oral tablet: 1 tab(s) orally once a day  Multiple Vitamins oral tablet: 1 tab(s) orally once a day  oxyCODONE 5 mg oral tablet: 1 tab(s) orally every 4 hours, As Needed MDD:6   pregabalin 50 mg oral capsule: 1 cap(s) orally 2 times a day  rosuvastatin 20 mg oral tablet: orally once a day (at bedtime)  Vitamin D2 50,000 intl units (1.25 mg) oral capsule (obsolete): 1 cap(s) orally once a week   amLODIPine 5 mg oral tablet: 1 tab(s) orally once a day  calcium acetate 667 mg oral tablet: 1 tab(s) orally once a day   celecoxib 100 mg oral capsule: 1 cap orally every 12 hours. Take 2 hours after Aspirin.    Eliquis 2.5 mg oral tablet: 1 tab(s) orally every 12 hours   esomeprazole 40 mg oral delayed release capsule: 1 cap(s) orally once a day  hydroCHLOROthiazide 12.5 mg oral capsule: 1 cap(s) orally once a day  latanoprost 0.005% ophthalmic solution: 1 drop(s) to each affected eye once a day (in the evening)  losartan 100 mg oral tablet: 1 tab(s) orally once a day  Multiple Vitamins oral tablet: 1 tab(s) orally once a day  oxyCODONE 5 mg oral tablet: 1 tab(s) orally every 4 hours, As Needed MDD:6   pregabalin 50 mg oral capsule: 1 cap(s) orally 2 times a day  Rolling Walker with 5 inch wheels: Dx: s/p Right TKR  rosuvastatin 20 mg oral tablet: orally once a day (at bedtime)  Vitamin D2 50,000 intl units (1.25 mg) oral capsule (obsolete): 1 cap(s) orally once a week   acetaminophen 500 mg oral tablet: 2 tab(s) orally every 8 hours  amLODIPine 5 mg oral tablet: 1 tab(s) orally once a day  calcium acetate 667 mg oral tablet: 1 tab(s) orally once a day   celecoxib 100 mg oral capsule: 1 cap orally every 12 hours. Take 2 hours after Aspirin.    Eliquis 2.5 mg oral tablet: 1 tab(s) orally every 12 hours   esomeprazole 40 mg oral delayed release capsule: 1 cap(s) orally once a day  hydroCHLOROthiazide 12.5 mg oral capsule: 1 cap(s) orally once a day  latanoprost 0.005% ophthalmic solution: 1 drop(s) to each affected eye once a day (in the evening)  losartan 100 mg oral tablet: 1 tab(s) orally once a day  Multiple Vitamins oral tablet: 1 tab(s) orally once a day  oxyCODONE 5 mg oral tablet: 1 tab(s) orally every 4 hours, As Needed MDD:6   pregabalin 50 mg oral capsule: 1 cap(s) orally 2 times a day  Rolling Walker with 5 inch wheels: Dx: s/p Right TKR  rosuvastatin 20 mg oral tablet: orally once a day (at bedtime)  shower chair: s/p right TKA  Vitamin D2 50,000 intl units (1.25 mg) oral capsule (obsolete): 1 cap(s) orally once a week   acetaminophen 500 mg oral tablet: 2 tab(s) orally every 8 hours  amLODIPine 5 mg oral tablet: 1 tab(s) orally once a day  calcium acetate 667 mg oral tablet: 1 tab(s) orally once a day   celecoxib 100 mg oral capsule: 1 cap orally every 12 hours. Take 2 hours after Aspirin.    Eliquis 2.5 mg oral tablet: 1 tab(s) orally every 12 hours   esomeprazole 40 mg oral delayed release capsule: 1 cap(s) orally once a day  hydroCHLOROthiazide 12.5 mg oral capsule: 1 cap(s) orally once a day  latanoprost 0.005% ophthalmic solution: 1 drop(s) to each affected eye once a day (in the evening)  losartan 100 mg oral tablet: 1 tab(s) orally once a day  Multiple Vitamins oral tablet: 1 tab(s) orally once a day  oxyCODONE 5 mg oral tablet: 1 tab(s) orally every 4 hours, As Needed MDD:6   pregabalin 50 mg oral capsule: 1 cap(s) orally 2 times a day  Rolling Walker with 5 inch wheels: Dx: s/p Right TKR  rosuvastatin 20 mg oral tablet: orally once a day (at bedtime)  shower chair: s/p right TKA  Transport wheelchair: Dx: s/p right TKR  Vitamin D2 50,000 intl units (1.25 mg) oral capsule (obsolete): 1 cap(s) orally once a week

## 2021-10-11 NOTE — DISCHARGE NOTE PROVIDER - NSDCACTIVITY_GEN_ALL_CORE
Showering allowed/Stairs allowed/Walking - Indoors allowed/Walking - Outdoors allowed/Follow Instructions Provided by your Surgical Team Do not drive or operate machinery/Showering allowed/Stairs allowed/Walking - Indoors allowed/No heavy lifting/straining/Walking - Outdoors allowed/Follow Instructions Provided by your Surgical Team

## 2021-10-11 NOTE — PHYSICAL THERAPY INITIAL EVALUATION ADULT - BALANCE DISTURBANCE, SYSTEM IMPAIRMENT CONTRIBUTE, REHAB EVAL
What Type Of Note Output Would You Prefer (Optional)?: Standard Output How Severe Is Your Rash?: moderate Is This A New Presentation, Or A Follow-Up?: Rash musculoskeletal

## 2021-10-11 NOTE — DISCHARGE NOTE PROVIDER - NSDCFUSCHEDAPPT_GEN_ALL_CORE_FT
KOREY DURAN ; 10/11/2021 ; NP Orthosurg [facility]  KOREY DURAN ; 10/25/2021 ; NP OrthoSurg [address] [facility]  KOREY DURAN ; 12/14/2021 ; [facility] OrthoSurg [facility] KOREY DURAN ; 10/25/2021 ; [address]  KOREY DURAN ; 12/14/2021 ; [address]

## 2021-10-11 NOTE — DISCHARGE NOTE NURSING/CASE MANAGEMENT/SOCIAL WORK - NSDCDMETYPESERV_GEN_ALL_CORE_FT
Rolling Walker  [employer] Rolling Walker and shower chair if covered by the insurance. The company will contact you for delivery. [employer] Rolling Walker, Shower Chair + Transport Wheelchair if covered by the insurance. The company will contact you for delivery.

## 2021-10-11 NOTE — PROGRESS NOTE ADULT - SUBJECTIVE AND OBJECTIVE BOX
KOREY DURAN                                                                [id number]                                                       Allergies--- aspirin (Other)        Pt is a 81y year old Female s/p right TKR.  Pt is A&O x 3, resting comforably, with no complaints.   Pain is 2/10.   Denies chest pain / shortness of breath / dyspnea / nausea / vomiting / headaches or light headed ness.          T(C): 36.9 (10-11-21 @ 14:14), Max: 37.1 (10-11-21 @ 10:26)  HR: 80 (10-11-21 @ 15:15) (68 - 87)  BP: 110/61 (10-11-21 @ 15:15) (98/60 - 132/78)  RR: 19 (10-11-21 @ 15:15) (14 - 20)  SpO2: 97% (10-11-21 @ 15:15) (90% - 99%)  Wt(kg): --    I&O's Detail    11 Oct 2021 07:01  -  11 Oct 2021 15:25  --------------------------------------------------------  IN:    Lactated Ringers: 1300 mL    Oral Fluid: 420 mL    Sodium Chloride 0.9% Bolus: 500 mL  Total IN: 2220 mL    OUT:    Blood Loss (mL): 200 mL  Total OUT: 200 mL    Total NET: 2020 mL        PE:   Right Lower Extremity:   Dressing/Ace bandage is C/D/I.   Neurovascularly intact.   No gross evidence of motor or sensory deficits.   +2 DP/PT pulses.   EHL/FHL/TA intact.   Toes are pink and mobile.   Capillary refill < 2 seconds.  PAS in place        A:   Pt is a 81y year old Female S/P right total knee replacement, Post Op Day #0        Plan:    - Follow up with Medicine    - OOB with PT/OT   - Pain control    - Continue antibiotics as per SCIP protocol   - Incentive spirometry   - PAS stockings   - Following Lab results including Labs in A.M.   - Close monitoring   - DVT ppx =                                                                                                                                                                            Caleb WEINBERG

## 2021-10-11 NOTE — DISCHARGE NOTE NURSING/CASE MANAGEMENT/SOCIAL WORK - PATIENT PORTAL LINK FT
You can access the FollowMyHealth Patient Portal offered by [facility] by registering at the following website: [email or web address]. By joining [employer]’s FollowMyHealth portal, you will also be able to view your health information using other applications (apps) compatible with our system.

## 2021-10-11 NOTE — DISCHARGE NOTE NURSING/CASE MANAGEMENT/SOCIAL WORK - NSDCDMENAME_GEN_ALL_CORE_FT
[facility] [place]   [address] [place] [address]

## 2021-10-11 NOTE — DISCHARGE NOTE PROVIDER - NSDCCPCAREPLAN_GEN_ALL_CORE_FT
PRINCIPAL DISCHARGE DIAGNOSIS  Diagnosis: Primary osteoarthritis of right knee  Assessment and Plan of Treatment: Physical Therapy/Occupational Therapy for: ambulation, transfers, stairs, ADL's (activities of daily living), and range of motion exercises  -Activity  • Weight Bearing as tolerated with rolling walker.  • Take short, frequent walks increasing the distance that you walk each day as tolerated.  • Change your position every hour to decrease pain and stiffness.  • Continue the exercises taught to you by your physical therapist.  • No driving until cleared by the doctor.  • No tub baths, hot tubs, or swimming pools until instructed by your doctor.  • Do not squat down on the floor.  • Do not kneel or twist your knee.  • Range of Motion Goals: Flexion= 120 degrees, Extension = 0 degrees  Ice & Elevate leg to decrease pain/swelling  You have prineo tape over your incision, this is a waterproof seal.  You may shower if there is no drainage present from wound.  Allow warm/soapy water to run over prineo tape, dab dry with a clean towel after shower.  No Salves/ointments over prineo tape, tape will be removed at surgeons office at post operative visit

## 2021-10-11 NOTE — DISCHARGE NOTE PROVIDER - INSTRUCTIONS
For Constipation :   • Increase your water intake. Drink at least 8 glasses of water daily.  • Try adding fiber to your diet by eating fruits, vegetables and foods that are rich in grains.  • If you do experience constipation, you may take an over-the-counter stool softener/laxative such as Vero Colace, Senekot, miralax or  Milk of Magnesia.

## 2021-10-11 NOTE — DISCHARGE NOTE PROVIDER - NSDCFUADDINST_GEN_ALL_CORE_FT
For Constipation :   • Increase your water intake. Drink at least 8 glasses of water daily.  • Try adding fiber to your diet by eating fruits, vegetables and foods that are rich in grains.  • If you do experience constipation, you may take an over-the-counter stool softener/laxative such as Vero Colace, Senekot or Milk of Magnesia.  - Call your doctor if you experience:  • An increase in pain not controlled by pain medication or change in activity or  position.  • Temperature greater than 101° F.  • Redness, increased swelling or foul smelling drainage from or around the  incision.  • Numbness, tingling or a change in color or temperature of the operative leg.  • Call your doctor immediately if you experience chest pain, shortness of breath or calf pain.   Call your doctor if you experience:  • An increase in pain not controlled by pain medication or change in activity or  position.  • Temperature greater than 101° F.  • Redness, increased swelling or foul smelling drainage from or around the  incision.  • Numbness, tingling or a change in color or temperature of the operative leg.  • Call your doctor immediately if you experience chest pain, shortness of breath or calf pain.

## 2021-10-11 NOTE — BRIEF OPERATIVE NOTE - NSICDXBRIEFPREOP_GEN_ALL_CORE_FT
PRE-OP DIAGNOSIS:  DJD (degenerative joint disease) of knee 11-Oct-2021 14:14:07 Right Javon Segal

## 2021-10-11 NOTE — CONSULT NOTE ADULT - ASSESSMENT
81F Atrial Fibrillation, HTN, HLD, GERD and OA admitted for aftercare following Right TKR.     S/P Right TKR 10/11/21  - Continue Bowel and pain control regimen;  Incentive Spirometer for lung expansion; Monitor Hgb and follow up electrolytes.      Atrial Fibrillation / HTN / HLD - BP and Rate controlled. Continue Amlodipine and Statin; Hold Losartan.  Eliquis with adjusted dose and then transition to full dose    GERD - PPI    Diet - Regular    DVT Prophylaxis - Eliquis    Disposition - Discharge planning pending hospital course

## 2021-10-12 LAB
ANION GAP SERPL CALC-SCNC: 4 MMOL/L — LOW (ref 5–17)
BUN SERPL-MCNC: 13 MG/DL — SIGNIFICANT CHANGE UP (ref 7–23)
CALCIUM SERPL-MCNC: 8.3 MG/DL — LOW (ref 8.4–10.5)
CHLORIDE SERPL-SCNC: 99 MMOL/L — SIGNIFICANT CHANGE UP (ref 96–108)
CO2 SERPL-SCNC: 29 MMOL/L — SIGNIFICANT CHANGE UP (ref 22–31)
COVID-19 SPIKE DOMAIN AB INTERP: POSITIVE
COVID-19 SPIKE DOMAIN ANTIBODY RESULT: >250 U/ML — HIGH
CREAT SERPL-MCNC: 0.56 MG/DL — SIGNIFICANT CHANGE UP (ref 0.5–1.3)
GLUCOSE SERPL-MCNC: 125 MG/DL — HIGH (ref 70–99)
HCT VFR BLD CALC: 37.7 % — SIGNIFICANT CHANGE UP (ref 34.5–45)
HGB BLD-MCNC: 12.9 G/DL — SIGNIFICANT CHANGE UP (ref 11.5–15.5)
MCHC RBC-ENTMCNC: 33.7 PG — SIGNIFICANT CHANGE UP (ref 27–34)
MCHC RBC-ENTMCNC: 34.2 GM/DL — SIGNIFICANT CHANGE UP (ref 32–36)
MCV RBC AUTO: 98.4 FL — SIGNIFICANT CHANGE UP (ref 80–100)
NRBC # BLD: 0 /100 WBCS — SIGNIFICANT CHANGE UP (ref 0–0)
PLATELET # BLD AUTO: 255 K/UL — SIGNIFICANT CHANGE UP (ref 150–400)
POTASSIUM SERPL-MCNC: 4 MMOL/L — SIGNIFICANT CHANGE UP (ref 3.5–5.3)
POTASSIUM SERPL-SCNC: 4 MMOL/L — SIGNIFICANT CHANGE UP (ref 3.5–5.3)
RBC # BLD: 3.83 M/UL — SIGNIFICANT CHANGE UP (ref 3.8–5.2)
RBC # FLD: 12.6 % — SIGNIFICANT CHANGE UP (ref 10.3–14.5)
SARS-COV-2 IGG+IGM SERPL QL IA: >250 U/ML — HIGH
SARS-COV-2 IGG+IGM SERPL QL IA: POSITIVE
SODIUM SERPL-SCNC: 132 MMOL/L — LOW (ref 135–145)
WBC # BLD: 14.08 K/UL — HIGH (ref 3.8–10.5)
WBC # FLD AUTO: 14.08 K/UL — HIGH (ref 3.8–10.5)

## 2021-10-12 PROCEDURE — 99232 SBSQ HOSP IP/OBS MODERATE 35: CPT

## 2021-10-12 RX ORDER — OXYCODONE HYDROCHLORIDE 5 MG/1
1 TABLET ORAL
Qty: 42 | Refills: 0
Start: 2021-10-12 | End: 2021-10-18

## 2021-10-12 RX ORDER — APIXABAN 2.5 MG/1
1 TABLET, FILM COATED ORAL
Qty: 0 | Refills: 0 | DISCHARGE

## 2021-10-12 RX ORDER — APIXABAN 2.5 MG/1
2.5 TABLET, FILM COATED ORAL EVERY 12 HOURS
Refills: 0 | Status: DISCONTINUED | OUTPATIENT
Start: 2021-10-13 | End: 2021-10-15

## 2021-10-12 RX ORDER — ACETAMINOPHEN 500 MG
1 TABLET ORAL
Qty: 0 | Refills: 0 | DISCHARGE

## 2021-10-12 RX ORDER — APIXABAN 2.5 MG/1
1 TABLET, FILM COATED ORAL
Qty: 60 | Refills: 0
Start: 2021-10-12 | End: 2021-11-10

## 2021-10-12 RX ADMIN — ATORVASTATIN CALCIUM 80 MILLIGRAM(S): 80 TABLET, FILM COATED ORAL at 21:20

## 2021-10-12 RX ADMIN — Medication 100 MILLIGRAM(S): at 04:34

## 2021-10-12 RX ADMIN — OXYCODONE HYDROCHLORIDE 5 MILLIGRAM(S): 5 TABLET ORAL at 08:42

## 2021-10-12 RX ADMIN — CELECOXIB 100 MILLIGRAM(S): 200 CAPSULE ORAL at 08:55

## 2021-10-12 RX ADMIN — APIXABAN 2.5 MILLIGRAM(S): 2.5 TABLET, FILM COATED ORAL at 12:23

## 2021-10-12 RX ADMIN — Medication 50 MILLIGRAM(S): at 17:50

## 2021-10-12 RX ADMIN — PANTOPRAZOLE SODIUM 40 MILLIGRAM(S): 20 TABLET, DELAYED RELEASE ORAL at 04:34

## 2021-10-12 RX ADMIN — Medication 1000 MILLIGRAM(S): at 15:20

## 2021-10-12 RX ADMIN — Medication 400 MILLIGRAM(S): at 00:23

## 2021-10-12 RX ADMIN — OXYCODONE HYDROCHLORIDE 5 MILLIGRAM(S): 5 TABLET ORAL at 09:35

## 2021-10-12 RX ADMIN — Medication 50 MILLIGRAM(S): at 04:34

## 2021-10-12 RX ADMIN — Medication 1000 MILLIGRAM(S): at 08:55

## 2021-10-12 RX ADMIN — SENNA PLUS 2 TABLET(S): 8.6 TABLET ORAL at 21:20

## 2021-10-12 RX ADMIN — LATANOPROST 1 DROP(S): 0.05 SOLUTION/ DROPS OPHTHALMIC; TOPICAL at 21:20

## 2021-10-12 RX ADMIN — Medication 1000 MILLIGRAM(S): at 08:42

## 2021-10-12 RX ADMIN — CELECOXIB 100 MILLIGRAM(S): 200 CAPSULE ORAL at 21:20

## 2021-10-12 RX ADMIN — Medication 1000 MILLIGRAM(S): at 21:21

## 2021-10-12 RX ADMIN — CELECOXIB 100 MILLIGRAM(S): 200 CAPSULE ORAL at 08:42

## 2021-10-12 RX ADMIN — APIXABAN 2.5 MILLIGRAM(S): 2.5 TABLET, FILM COATED ORAL at 21:21

## 2021-10-12 RX ADMIN — Medication 1000 MILLIGRAM(S): at 14:57

## 2021-10-12 NOTE — OCCUPATIONAL THERAPY INITIAL EVALUATION ADULT - UPPER BODY DRESSING, PREVIOUS LEVEL OF FUNCTION, OT EVAL
Pelvis and left hip: AP view of the pelvis was obtained as well as AP 

and frog leg lateral views of the left hip.

 

Comparison: No previous pelvis or hip study is available.

 

Joint spaces within both hips are preserved.  Sacroiliac joints show 

very slight sclerosis compatible with minimal degenerative change.  No

 acute fracture, dislocation or other bony abnormality is appreciated.

  Three calcifications are seen inferior to the pubic symphysis which 

are believed to be chronic.

 

Impression:

1.  Minimal degenerative change within the sacroiliac joints.

2.  AP pelvis and two-view left hip exam show nothing acute.

 

Diagnostic code #2

 

I minimally disagree with preliminary report from [facility] (mild 

degenerative change within the sacroiliac joints), finalized on 

09/21/21, 10:32 PM CDT, code 2 independent

## 2021-10-12 NOTE — PROGRESS NOTE ADULT - SUBJECTIVE AND OBJECTIVE BOX
Subjective: Patient seen and examined.  Doing well and working with PT.  Pain controlled.     MEDICATIONS  (STANDING):  acetaminophen   Tablet .. 1000 milliGRAM(s) Oral every 8 hours  amLODIPine   Tablet 5 milliGRAM(s) Oral daily  apixaban 2.5 milliGRAM(s) Oral <User Schedule>  atorvastatin 80 milliGRAM(s) Oral at bedtime  celecoxib 100 milliGRAM(s) Oral every 12 hours  lactated ringers. 1000 milliLiter(s) (100 mL/Hr) IV Continuous <Continuous>  latanoprost 0.005% Ophthalmic Solution 1 Drop(s) Both EYES at bedtime  pantoprazole    Tablet 40 milliGRAM(s) Oral before breakfast  polyethylene glycol 3350 17 Gram(s) Oral at bedtime  pregabalin 50 milliGRAM(s) Oral two times a day  senna 2 Tablet(s) Oral at bedtime    MEDICATIONS  (PRN):  HYDROmorphone  Injectable 0.5 milliGRAM(s) IV Push every 3 hours PRN breakthrough pain  magnesium hydroxide Suspension 30 milliLiter(s) Oral daily PRN Constipation  oxyCODONE    IR 5 milliGRAM(s) Oral every 3 hours PRN Moderate Pain (4 - 6)  oxyCODONE    IR 10 milliGRAM(s) Oral every 3 hours PRN Severe Pain (7 - 10)      Allergies    aspirin (Other)    Intolerances        Vital Signs Last 24 Hrs  T(C): 36.4 (12 Oct 2021 08:13), Max: 36.9 (11 Oct 2021 14:14)  T(F): 97.5 (12 Oct 2021 08:13), Max: 98.4 (11 Oct 2021 14:14)  HR: 68 (12 Oct 2021 08:13) (66 - 87)  BP: 109/77 (12 Oct 2021 08:13) (91/62 - 114/75)  BP(mean): --  RR: 16 (12 Oct 2021 08:13) (14 - 20)  SpO2: 94% (12 Oct 2021 08:13) (90% - 99%)    PHYSICAL EXAM:  GENERAL: NAD, well-groomed, well-developed  HEAD:  Atraumatic, Normocephalic  ENMT: Moist mucous membranes,   NECK: Supple, No JVD, Normal thyroid  NERVOUS SYSTEM:  All 4 extremities mobile, no gross sensory deficits.   CHEST/LUNG: Clear to auscultation bilaterally; No rales, rhonchi, wheezing, or rubs  HEART: Regular rate and rhythm; No murmurs, rubs, or gallops  ABDOMEN: Soft, Nontender, Nondistended; Bowel sounds present  EXTREMITIES:  2+ Peripheral Pulses, No clubbing, cyanosis, or edema      LABS:                        12.9   14.08 )-----------( 255      ( 12 Oct 2021 07:48 )             37.7     12 Oct 2021 07:48    132    |  99     |  13     ----------------------------<  125    4.0     |  29     |  0.56     Ca    8.3        12 Oct 2021 07:48      PT/INR - ( 11 Oct 2021 10:48 )   PT: 12.1 sec;   INR: 1.00 ratio         PTT - ( 11 Oct 2021 11:06 )  PTT:32.1 sec    CAPILLARY BLOOD GLUCOSE          RADIOLOGY & ADDITIONAL TESTS:    Imaging Personally Reviewed:  [ ] YES     Consultant(s) Notes Reviewed:      Care Discussed with Consultants/Other Providers:    Advanced Directives: [ ] DNR  [ ] No feeding tube  [ ] MOLST in chart  [ ] MOLST completed today  [ ] Unknown

## 2021-10-12 NOTE — PROGRESS NOTE ADULT - SUBJECTIVE AND OBJECTIVE BOX
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   -   Wound C/D/I.   -   Distal Neurvascular status intact grossly.   -   Warm well perfused; capillary refill <3 seconds   -   (+)EHL/FHL 5/5  -   (+) Sensation to light touch  -   (-) Calf tenderness Bilaterally    A/P: 81y Female s/p DJD (degenerative joint disease) of knee  Right      -   Ortho Stable  -   Pain control   -   Medicine to follow  -   DVT ppx:     [x ]SCDs     [ ] ASA     [x ] Eliquis     [ ] Lovenox  -   Weight bearing status:  WBAT [ ]        PWB    [ ]     TTWB  [ ]      NWB  [ ]   -  Dispo:     Home [x ]     Acute Rehab [ ]     [facility] [ ]     TBD [ ]

## 2021-10-12 NOTE — PHARMACOTHERAPY INTERVENTION NOTE - COMMENTS
Admission medication reconciliation POD1
Transition of Care video discharge education - medication calendar given to patient
Patient is 80YO and weighs less than 60kg. Eliquis for AF dose is 2.5mg q12h. Patient was taking Eliquis 5mg BID at home. Prescriber contacted and dose lowered.

## 2021-10-12 NOTE — PROGRESS NOTE ADULT - SUBJECTIVE AND OBJECTIVE BOX
Discharge medication calendar:  (Eliquis 5mg BID preop)  Eliquis 2.5mg q12h (patient is 80YO and weighs less than 60kg - outpatient prescriber notified)  APAP 1000mg q8h x 2-3 weeks  Celecoxib 100mg q12h x 2-3 weeks  Esomeprazole 40mg QAM (home med)  Narcotic PRN  Docusate 100mg TID while taking narcotic  Miralax, Senna, or Bisacodyl PRN for treatment of constipation

## 2021-10-12 NOTE — PROGRESS NOTE ADULT - ASSESSMENT
81F Atrial Fibrillation, HTN, HLD, GERD and OA admitted for aftercare following Right TKR.     S/P Right TKR 10/11/21  - Continue Bowel and pain control regimen;  Incentive Spirometer for lung expansion; Monitor Hgb and follow up electrolytes.      Atrial Fibrillation / HTN / HLD - BP and Rate controlled. Continue Amlodipine and Statin; Hold Losartan.  Eliquis with adjusted dose and then transition to full dose    GERD - PPI    Diet - Regular    DVT Prophylaxis - Eliquis    Disposition - Discharge planning pending hospital course  81F Atrial Fibrillation, HTN, HLD, GERD and OA admitted for aftercare following Right TKR.     S/P Right TKR 10/11/21  - Continue Bowel and pain control regimen;  Incentive Spirometer for lung expansion; Monitor Hgb and follow up electrolytes.      Atrial Fibrillation / HTN / HLD - BP and Rate controlled. Continue Amlodipine and Statin; Hold Losartan.  Eliquis with adjusted dose and then transition to full dose    GERD - PPI    Diet - Regular    DVT Prophylaxis - Eliquis    Disposition - Patient medically optimized for discharge home if cleared by PT and Ortho.

## 2021-10-13 LAB
ANION GAP SERPL CALC-SCNC: 5 MMOL/L — SIGNIFICANT CHANGE UP (ref 5–17)
BUN SERPL-MCNC: 15 MG/DL — SIGNIFICANT CHANGE UP (ref 7–23)
CALCIUM SERPL-MCNC: 8.2 MG/DL — LOW (ref 8.4–10.5)
CHLORIDE SERPL-SCNC: 99 MMOL/L — SIGNIFICANT CHANGE UP (ref 96–108)
CO2 SERPL-SCNC: 29 MMOL/L — SIGNIFICANT CHANGE UP (ref 22–31)
CREAT SERPL-MCNC: 0.61 MG/DL — SIGNIFICANT CHANGE UP (ref 0.5–1.3)
GLUCOSE SERPL-MCNC: 121 MG/DL — HIGH (ref 70–99)
HCT VFR BLD CALC: 37.3 % — SIGNIFICANT CHANGE UP (ref 34.5–45)
HGB BLD-MCNC: 12.7 G/DL — SIGNIFICANT CHANGE UP (ref 11.5–15.5)
MCHC RBC-ENTMCNC: 33.8 PG — SIGNIFICANT CHANGE UP (ref 27–34)
MCHC RBC-ENTMCNC: 34 GM/DL — SIGNIFICANT CHANGE UP (ref 32–36)
MCV RBC AUTO: 99.2 FL — SIGNIFICANT CHANGE UP (ref 80–100)
NRBC # BLD: 0 /100 WBCS — SIGNIFICANT CHANGE UP (ref 0–0)
PLATELET # BLD AUTO: 245 K/UL — SIGNIFICANT CHANGE UP (ref 150–400)
POTASSIUM SERPL-MCNC: 4 MMOL/L — SIGNIFICANT CHANGE UP (ref 3.5–5.3)
POTASSIUM SERPL-SCNC: 4 MMOL/L — SIGNIFICANT CHANGE UP (ref 3.5–5.3)
RBC # BLD: 3.76 M/UL — LOW (ref 3.8–5.2)
RBC # FLD: 12.8 % — SIGNIFICANT CHANGE UP (ref 10.3–14.5)
SODIUM SERPL-SCNC: 133 MMOL/L — LOW (ref 135–145)
WBC # BLD: 11.53 K/UL — HIGH (ref 3.8–10.5)
WBC # FLD AUTO: 11.53 K/UL — HIGH (ref 3.8–10.5)

## 2021-10-13 PROCEDURE — 93970 EXTREMITY STUDY: CPT | Mod: 26

## 2021-10-13 PROCEDURE — 99233 SBSQ HOSP IP/OBS HIGH 50: CPT

## 2021-10-13 RX ORDER — ACETAMINOPHEN 500 MG
2 TABLET ORAL
Qty: 0 | Refills: 0 | DISCHARGE
Start: 2021-10-13 | End: 2021-10-27

## 2021-10-13 RX ADMIN — Medication 1000 MILLIGRAM(S): at 22:30

## 2021-10-13 RX ADMIN — APIXABAN 2.5 MILLIGRAM(S): 2.5 TABLET, FILM COATED ORAL at 09:27

## 2021-10-13 RX ADMIN — Medication 50 MILLIGRAM(S): at 06:23

## 2021-10-13 RX ADMIN — CELECOXIB 100 MILLIGRAM(S): 200 CAPSULE ORAL at 21:30

## 2021-10-13 RX ADMIN — LOSARTAN POTASSIUM 100 MILLIGRAM(S): 100 TABLET, FILM COATED ORAL at 06:23

## 2021-10-13 RX ADMIN — CELECOXIB 100 MILLIGRAM(S): 200 CAPSULE ORAL at 09:27

## 2021-10-13 RX ADMIN — Medication 1000 MILLIGRAM(S): at 22:15

## 2021-10-13 RX ADMIN — LATANOPROST 1 DROP(S): 0.05 SOLUTION/ DROPS OPHTHALMIC; TOPICAL at 21:26

## 2021-10-13 RX ADMIN — ATORVASTATIN CALCIUM 80 MILLIGRAM(S): 80 TABLET, FILM COATED ORAL at 21:25

## 2021-10-13 RX ADMIN — CELECOXIB 100 MILLIGRAM(S): 200 CAPSULE ORAL at 09:57

## 2021-10-13 RX ADMIN — Medication 1000 MILLIGRAM(S): at 06:22

## 2021-10-13 RX ADMIN — AMLODIPINE BESYLATE 5 MILLIGRAM(S): 2.5 TABLET ORAL at 06:22

## 2021-10-13 RX ADMIN — OXYCODONE HYDROCHLORIDE 5 MILLIGRAM(S): 5 TABLET ORAL at 09:26

## 2021-10-13 RX ADMIN — SENNA PLUS 2 TABLET(S): 8.6 TABLET ORAL at 21:25

## 2021-10-13 RX ADMIN — CELECOXIB 100 MILLIGRAM(S): 200 CAPSULE ORAL at 21:25

## 2021-10-13 RX ADMIN — APIXABAN 2.5 MILLIGRAM(S): 2.5 TABLET, FILM COATED ORAL at 21:25

## 2021-10-13 RX ADMIN — OXYCODONE HYDROCHLORIDE 5 MILLIGRAM(S): 5 TABLET ORAL at 14:23

## 2021-10-13 RX ADMIN — Medication 1000 MILLIGRAM(S): at 14:22

## 2021-10-13 RX ADMIN — Medication 50 MILLIGRAM(S): at 17:36

## 2021-10-13 RX ADMIN — OXYCODONE HYDROCHLORIDE 5 MILLIGRAM(S): 5 TABLET ORAL at 14:53

## 2021-10-13 RX ADMIN — OXYCODONE HYDROCHLORIDE 5 MILLIGRAM(S): 5 TABLET ORAL at 09:56

## 2021-10-13 RX ADMIN — PANTOPRAZOLE SODIUM 40 MILLIGRAM(S): 20 TABLET, DELAYED RELEASE ORAL at 06:22

## 2021-10-13 RX ADMIN — Medication 1000 MILLIGRAM(S): at 14:52

## 2021-10-13 NOTE — PROGRESS NOTE ADULT - SUBJECTIVE AND OBJECTIVE BOX
ORTHOPEDIC PA PROGRESS NOTE  KOREY DURAN      81y Female                                 SY 2WST 227 [id number]                                                                                                                           POD #    2d    STATUS POST:       Procedure: Knee replacement  Right               Patient seen and examined at bedside.      Current Pain Management:    acetaminophen   Tablet .. 1000 milliGRAM(s) Oral every 8 hours  celecoxib 100 milliGRAM(s) Oral every 12 hours  HYDROmorphone  Injectable 0.5 milliGRAM(s) IV Push every 3 hours PRN  oxyCODONE    IR 5 milliGRAM(s) Oral every 3 hours PRN  oxyCODONE    IR 10 milliGRAM(s) Oral every 3 hours PRN  pregabalin 50 milliGRAM(s) Oral two times a day      T(F): 97.8  HR: 78  BP: 120/85  RR: 16  SpO2: 98%                         12.7   11.53 )-----------( 245      ( 13 Oct 2021 07:47 )             37.3               10-12-21 @ 07:01  -  10-13-21 @ 07:00  --------------------------------------------------------  IN:  Total IN: 0 mL    OUT:    Voided (mL): 1 mL  Total OUT: 1 mL    Total NET: -1 mL        Physical Exam :      -   Wound with prineo C/D/I.   -   Distal Neurvascular status intact grossly.   -   Warm well perfused; capillary refill <3 seconds   -   (+)EHL/FHL   -   (+) Sensation to light touch  -   (-) Calf tenderness Bilaterally      A/P: 81y Female s/p Knee replacement  Right       -   Ortho Stable  -   Pain control:  acetaminophen   Tablet .. 1000 milliGRAM(s) Oral every 8 hours  celecoxib 100 milliGRAM(s) Oral every 12 hours  HYDROmorphone  Injectable 0.5 milliGRAM(s) IV Push every 3 hours PRN  oxyCODONE    IR 5 milliGRAM(s) Oral every 3 hours PRN  oxyCODONE    IR 10 milliGRAM(s) Oral every 3 hours PRN  pregabalin 50 milliGRAM(s) Oral two times a day    -   Medicine to follow  -   DVT ppx:    PAS +  apixaban: 2.5 milliGRAM(s) Oral, apixaban: 2.5 milliGRAM(s) Oral  -   PT/OT OOB,  Weight bearing status: WBAT   -  Dispo:  Home  -   Prescribed Medications:  calcium acetate 667 mg oral tablet: 1 tab(s) orally once a day   celecoxib 100 mg oral capsule: 1 cap orally every 12 hours. Take 2 hours after Aspirin.    Eliquis 2.5 mg oral tablet: 1 tab(s) orally every 12 hours   hydroCHLOROthiazide 12.5 mg oral capsule: 1 cap(s) orally once a day  losartan 100 mg oral tablet: 1 tab(s) orally once a day  Multiple Vitamins oral tablet: 1 tab(s) orally once a day  oxyCODONE 5 mg oral tablet: 1 tab(s) orally every 4 hours, As Needed MDD:6   Rolling Walker with 5 inch wheels: Dx: s/p Right TKR  shower chair: s/p right TKA

## 2021-10-13 NOTE — PROGRESS NOTE ADULT - SUBJECTIVE AND OBJECTIVE BOX
INTERVAL HPI/OVERNIGHT EVENTS:   [employer]  used via [employer] service.  Patient seen and examined.  She reports no pain at surgical site, just fatigue since surgery.  No fevers, chills, sweats, dizziness, HA, changes in vision, cp, palpitations, sob, persistent cough, n/v/d, abd pain, dysuria, focal weakness, or calf pain.     REVIEW OF SYSTEMS:  See HPI,  all others negative    PHYSICAL EXAM:  Vital Signs Last 24 Hrs  T(C): 36.6 (13 Oct 2021 07:40), Max: 37.1 (12 Oct 2021 15:00)  T(F): 97.8 (13 Oct 2021 07:40), Max: 98.7 (12 Oct 2021 15:00)  HR: 78 (13 Oct 2021 07:40) (68 - 88)  BP: 120/85 (13 Oct 2021 07:40) (99/63 - 138/81)  BP(mean): --  RR: 16 (13 Oct 2021 07:40) (16 - 16)  SpO2: 98% (13 Oct 2021 07:40) (93% - 98%)    GENERAL: NAD, well-groomed, well-developed, awake, alert, oriented x 3, fluent and coherent speech, very talkative  EYES: EOMI, PERRLA, conjunctiva and sclera clear  ENMT: No tonsillar erythema, exudates, or enlargement; Moist mucous membranes,  No lesions seen on oral mucosa  NECK: Supple, No JVD, No Cervical LAD, No thyromegaly, No thyroid nodules felt  NERVOUS SYSTEM:  Good concentration; Moving all 4 extremities against gravity e; No gross sensory deficits, No facial droop  CHEST/LUNG: Clear to auscultation bilaterally with good air entry; No rales, rhonchi, wheezing, or rubs  HEART: Regular rate and rhythm; No murmurs, rubs, or gallops  ABDOMEN: Soft, Nontender, Nondistended, Bowel sounds present, No palpable masses or organomegaly, No bruits  EXTREMITIES:  2+ Peripheral Pulses, No clubbing, cyanosis, or edema, + calf tenderness R > L  WOUND: prineo, c/d/i, no drainage    Diagnostic Testin.7   11.53 )-----------( 245      ( 13 Oct 2021 07:47 )             37.3     13 Oct 2021 07:47    133    |  99     |  15     ----------------------------<  121    4.0     |  29     |  0.61     Ca    8.2        13 Oct 2021 07:47      PT/INR - ( 11 Oct 2021 10:48 )   PT: 12.1 sec;   INR: 1.00 ratio         PTT - ( 11 Oct 2021 11:06 )  PTT:32.1 sec

## 2021-10-13 NOTE — PROGRESS NOTE ADULT - ASSESSMENT
81F Atrial Fibrillation, HTN, HLD, GERD and OA admitted for aftercare following Right TKR.     S/P Right TKR 10/11/21    - Pain control  - Bowel regimen  - PT/OT  - Incentive spirometry  - VTE PPx - will be on eliquis for afib  - calf tenderness to palpation R>L, ordered dopplers  - stable for discharge from medical perspective if dopplers neg    Atrial Fibrillation / HTN / HLD - BP and Rate controlled. Continue Amlodipine and Statin; Hold Losartan.  Eliquis dose adjusted down to 2.5mg BID given age > 80 and wt <60kg    GERD - PPI

## 2021-10-14 LAB
ANION GAP SERPL CALC-SCNC: 4 MMOL/L — LOW (ref 5–17)
BUN SERPL-MCNC: 18 MG/DL — SIGNIFICANT CHANGE UP (ref 7–23)
CALCIUM SERPL-MCNC: 7.9 MG/DL — LOW (ref 8.4–10.5)
CHLORIDE SERPL-SCNC: 98 MMOL/L — SIGNIFICANT CHANGE UP (ref 96–108)
CO2 SERPL-SCNC: 31 MMOL/L — SIGNIFICANT CHANGE UP (ref 22–31)
CREAT SERPL-MCNC: 0.57 MG/DL — SIGNIFICANT CHANGE UP (ref 0.5–1.3)
GLUCOSE SERPL-MCNC: 90 MG/DL — SIGNIFICANT CHANGE UP (ref 70–99)
HCT VFR BLD CALC: 36.5 % — SIGNIFICANT CHANGE UP (ref 34.5–45)
HGB BLD-MCNC: 12.4 G/DL — SIGNIFICANT CHANGE UP (ref 11.5–15.5)
MCHC RBC-ENTMCNC: 33.6 PG — SIGNIFICANT CHANGE UP (ref 27–34)
MCHC RBC-ENTMCNC: 34 GM/DL — SIGNIFICANT CHANGE UP (ref 32–36)
MCV RBC AUTO: 98.9 FL — SIGNIFICANT CHANGE UP (ref 80–100)
NRBC # BLD: 0 /100 WBCS — SIGNIFICANT CHANGE UP (ref 0–0)
PLATELET # BLD AUTO: 238 K/UL — SIGNIFICANT CHANGE UP (ref 150–400)
POTASSIUM SERPL-MCNC: 3.8 MMOL/L — SIGNIFICANT CHANGE UP (ref 3.5–5.3)
POTASSIUM SERPL-SCNC: 3.8 MMOL/L — SIGNIFICANT CHANGE UP (ref 3.5–5.3)
RBC # BLD: 3.69 M/UL — LOW (ref 3.8–5.2)
RBC # FLD: 12.9 % — SIGNIFICANT CHANGE UP (ref 10.3–14.5)
SODIUM SERPL-SCNC: 133 MMOL/L — LOW (ref 135–145)
WBC # BLD: 9.35 K/UL — SIGNIFICANT CHANGE UP (ref 3.8–10.5)
WBC # FLD AUTO: 9.35 K/UL — SIGNIFICANT CHANGE UP (ref 3.8–10.5)

## 2021-10-14 PROCEDURE — 99232 SBSQ HOSP IP/OBS MODERATE 35: CPT

## 2021-10-14 RX ORDER — OXYCODONE HYDROCHLORIDE 5 MG/1
5 TABLET ORAL ONCE
Refills: 0 | Status: DISCONTINUED | OUTPATIENT
Start: 2021-10-14 | End: 2021-10-14

## 2021-10-14 RX ADMIN — OXYCODONE HYDROCHLORIDE 10 MILLIGRAM(S): 5 TABLET ORAL at 19:24

## 2021-10-14 RX ADMIN — Medication 1000 MILLIGRAM(S): at 06:14

## 2021-10-14 RX ADMIN — Medication 50 MILLIGRAM(S): at 18:45

## 2021-10-14 RX ADMIN — CELECOXIB 100 MILLIGRAM(S): 200 CAPSULE ORAL at 21:30

## 2021-10-14 RX ADMIN — CELECOXIB 100 MILLIGRAM(S): 200 CAPSULE ORAL at 09:40

## 2021-10-14 RX ADMIN — Medication 1000 MILLIGRAM(S): at 23:26

## 2021-10-14 RX ADMIN — APIXABAN 2.5 MILLIGRAM(S): 2.5 TABLET, FILM COATED ORAL at 21:16

## 2021-10-14 RX ADMIN — OXYCODONE HYDROCHLORIDE 5 MILLIGRAM(S): 5 TABLET ORAL at 09:10

## 2021-10-14 RX ADMIN — PANTOPRAZOLE SODIUM 40 MILLIGRAM(S): 20 TABLET, DELAYED RELEASE ORAL at 06:13

## 2021-10-14 RX ADMIN — OXYCODONE HYDROCHLORIDE 10 MILLIGRAM(S): 5 TABLET ORAL at 23:45

## 2021-10-14 RX ADMIN — OXYCODONE HYDROCHLORIDE 10 MILLIGRAM(S): 5 TABLET ORAL at 23:26

## 2021-10-14 RX ADMIN — LOSARTAN POTASSIUM 100 MILLIGRAM(S): 100 TABLET, FILM COATED ORAL at 05:29

## 2021-10-14 RX ADMIN — LATANOPROST 1 DROP(S): 0.05 SOLUTION/ DROPS OPHTHALMIC; TOPICAL at 21:16

## 2021-10-14 RX ADMIN — OXYCODONE HYDROCHLORIDE 5 MILLIGRAM(S): 5 TABLET ORAL at 10:54

## 2021-10-14 RX ADMIN — POLYETHYLENE GLYCOL 3350 17 GRAM(S): 17 POWDER, FOR SOLUTION ORAL at 21:16

## 2021-10-14 RX ADMIN — Medication 1000 MILLIGRAM(S): at 14:10

## 2021-10-14 RX ADMIN — AMLODIPINE BESYLATE 5 MILLIGRAM(S): 2.5 TABLET ORAL at 05:29

## 2021-10-14 RX ADMIN — CELECOXIB 100 MILLIGRAM(S): 200 CAPSULE ORAL at 21:16

## 2021-10-14 RX ADMIN — SENNA PLUS 2 TABLET(S): 8.6 TABLET ORAL at 21:16

## 2021-10-14 RX ADMIN — Medication 1000 MILLIGRAM(S): at 14:40

## 2021-10-14 RX ADMIN — APIXABAN 2.5 MILLIGRAM(S): 2.5 TABLET, FILM COATED ORAL at 09:10

## 2021-10-14 RX ADMIN — OXYCODONE HYDROCHLORIDE 10 MILLIGRAM(S): 5 TABLET ORAL at 14:38

## 2021-10-14 RX ADMIN — Medication 50 MILLIGRAM(S): at 05:30

## 2021-10-14 RX ADMIN — CELECOXIB 100 MILLIGRAM(S): 200 CAPSULE ORAL at 09:10

## 2021-10-14 RX ADMIN — ATORVASTATIN CALCIUM 80 MILLIGRAM(S): 80 TABLET, FILM COATED ORAL at 21:16

## 2021-10-14 RX ADMIN — OXYCODONE HYDROCHLORIDE 10 MILLIGRAM(S): 5 TABLET ORAL at 14:08

## 2021-10-14 RX ADMIN — Medication 1000 MILLIGRAM(S): at 23:44

## 2021-10-14 RX ADMIN — Medication 1000 MILLIGRAM(S): at 06:13

## 2021-10-14 RX ADMIN — OXYCODONE HYDROCHLORIDE 5 MILLIGRAM(S): 5 TABLET ORAL at 11:24

## 2021-10-14 RX ADMIN — OXYCODONE HYDROCHLORIDE 10 MILLIGRAM(S): 5 TABLET ORAL at 18:45

## 2021-10-14 NOTE — PROGRESS NOTE ADULT - SUBJECTIVE AND OBJECTIVE BOX
Post Op Day #3    SUBJECTIVE    82yo Female status post right TKR .   Patient is alert and comfortable.    Pain is controlled with current pain regimen.  Denies nausea, vomiting, chest pain, shortness of breath, abdominal pain or fever.   No new complaints.    OBJECTIVE    Vital Signs Last 24 Hrs  T(C): 36.6 (14 Oct 2021 07:52), Max: 36.6 (14 Oct 2021 07:52)  T(F): 97.8 (14 Oct 2021 07:52), Max: 97.8 (14 Oct 2021 07:52)  HR: 83 (14 Oct 2021 07:52) (79 - 85)  BP: 119/83 (14 Oct 2021 07:52) (112/74 - 157/99)  BP(mean): --  RR: 16 (14 Oct 2021 07:52) (16 - 17)  SpO2: 93% (14 Oct 2021 07:52) (93% - 94%)  I&O's Summary    13 Oct 2021 07:01  -  14 Oct 2021 07:00  --------------------------------------------------------  IN: 60 mL / OUT: 0 mL / NET: 60 mL        PHYSICAL EXAM    Right knee incision  is clean, dry and intact.   No erythema/ No exudate/ No blistering/ No ecchymosis.   The calf is supple/nontender.   Sensation to light touch is grossly intact distally.   Motor function distally is intact.   No foot drop.   (2+) dorsalis pedis pulse. Capillary refill is less than 2 seconds. No cyanosis.                          12.7   11.53<H> )-----------( 245      ( 13 Oct 2021 07:47 )             37.3   13 Oct 2021 07:47    13 Oct 2021 07:47    133<L>  |  99     |  15     ----------------------------<  121<H>  4.0     |  29     |  0.61     Ca    8.2<L>      13 Oct 2021 07:47        ASSESSMENT AND PLAN  - Orthopedically stable  - DVT prophylaxis: PAS + Eliquis 2.5mg twice daily  - Continue physical therapy and occupational therapy  - Weight bearing as tolerated of the right lower extremity with assistance of a walker  - Incentive spirometry encouraged  - Pain control as clinically indicated  - Disposition:  Home when cleared by medicine, PT, and, OT

## 2021-10-14 NOTE — PROGRESS NOTE ADULT - SUBJECTIVE AND OBJECTIVE BOX
INTERVAL HPI/OVERNIGHT EVENTS:   [employer]  used    Patient seen and examined.  She reports no pain at surgical site, just fatigue since surgery.  No fevers, chills, sweats, dizziness, HA, changes in vision, cp, palpitations, sob, persistent cough, n/v/d, abd pain, dysuria, focal weakness, or calf pain.     REVIEW OF SYSTEMS:  See HPI,  all others negative    PHYSICAL EXAM:  Vital Signs Last 24 Hrs  T(C): 36.6 (14 Oct 2021 07:52), Max: 36.6 (14 Oct 2021 07:52)  T(F): 97.8 (14 Oct 2021 07:52), Max: 97.8 (14 Oct 2021 07:52)  HR: 83 (14 Oct 2021 07:52) (79 - 85)  BP: 119/83 (14 Oct 2021 07:52) (112/74 - 157/99)  BP(mean): --  RR: 16 (14 Oct 2021 07:52) (16 - 17)  SpO2: 93% (14 Oct 2021 07:52) (93% - 94%)    GENERAL: NAD, well-groomed, well-developed, awake, alert, oriented x 3, fluent and coherent speech, very talkative, sitting up in chair  EYES: EOMI, PERRLA, conjunctiva and sclera clear  ENMT: No tonsillar erythema, exudates, or enlargement; Moist mucous membranes   NECK: Supple, No JVD, No Cervical LAD, No thyromegaly, No thyroid nodules felt  NERVOUS SYSTEM:  Good concentration; Moving all 4 extremities against gravity; No gross sensory deficits, No facial droop  CHEST/LUNG: Clear to auscultation bilaterally with good air entry; No rales, rhonchi, wheezing, or rubs  HEART: No murmurs, rubs, or gallops  ABDOMEN: Soft, Nontender, Nondistended, Bowel sounds present, No palpable masses or organomegaly, No bruits  EXTREMITIES:  2+ Peripheral Pulses, No clubbing, cyanosis, or edema, + calf tenderness R > L  WOUND: prineo, c/d/i, no drainage    Diagnostic Testin.4   9.35  )-----------( 238      ( 14 Oct 2021 08:23 )             36.5     10-14    133<L>  |  98  |  18  ----------------------------<  90  3.8   |  31  |  0.57    Ca    7.9<L>      14 Oct 2021 08:23

## 2021-10-14 NOTE — PROGRESS NOTE ADULT - ASSESSMENT
81F Atrial Fibrillation, HTN, HLD, GERD and OA admitted for aftercare following Right TKR.     S/P Right TKR 10/11/21    - Pain control  - Bowel regimen  - PT/OT  - Incentive spirometry  - VTE PPx - will be on eliquis for afib  - calf tenderness to palpation R>L -> b/l LE dopplers negative for DVT  - stable for discharge from medical perspective   - Dispo:  Home vs. [facility]    Atrial Fibrillation / HTN / HLD - BP and Rate controlled. Continue Amlodipine and Statin; Hold Losartan.  Eliquis dose adjusted down to 2.5mg BID given age > 80 and wt <60kg    GERD - PPI     81F Atrial Fibrillation, HTN, HLD, GERD and OA admitted for aftercare following Right TKR.     S/P Right TKR 10/11/21    - Pain control  - Bowel regimen  - PT/OT  - Incentive spirometry  - VTE PPx - will be on eliquis for afib  - calf tenderness to palpation R>L -> b/l LE dopplers negative for DVT  - stable for discharge from medical perspective   - Dispo:  Home vs. [facility]    Atrial Fibrillation / HTN / HLD - BP and Rate controlled. Continue Amlodipine and Statin; Hold Losartan until POD#2.  Hold HCTZ until discharge.  Eliquis dose adjusted down to 2.5mg BID given age > 79 and wt <60kg    GERD - PPI

## 2021-10-15 VITALS
SYSTOLIC BLOOD PRESSURE: 97 MMHG | DIASTOLIC BLOOD PRESSURE: 61 MMHG | TEMPERATURE: 99 F | HEART RATE: 91 BPM | OXYGEN SATURATION: 92 % | RESPIRATION RATE: 16 BRPM

## 2021-10-15 PROCEDURE — 99239 HOSP IP/OBS DSCHRG MGMT >30: CPT

## 2021-10-15 RX ADMIN — Medication 1000 MILLIGRAM(S): at 05:47

## 2021-10-15 RX ADMIN — Medication 50 MILLIGRAM(S): at 05:48

## 2021-10-15 RX ADMIN — Medication 1000 MILLIGRAM(S): at 13:51

## 2021-10-15 RX ADMIN — PANTOPRAZOLE SODIUM 40 MILLIGRAM(S): 20 TABLET, DELAYED RELEASE ORAL at 05:47

## 2021-10-15 RX ADMIN — CELECOXIB 100 MILLIGRAM(S): 200 CAPSULE ORAL at 08:52

## 2021-10-15 RX ADMIN — Medication 1000 MILLIGRAM(S): at 13:52

## 2021-10-15 RX ADMIN — OXYCODONE HYDROCHLORIDE 10 MILLIGRAM(S): 5 TABLET ORAL at 09:12

## 2021-10-15 RX ADMIN — CELECOXIB 100 MILLIGRAM(S): 200 CAPSULE ORAL at 10:30

## 2021-10-15 RX ADMIN — Medication 1000 MILLIGRAM(S): at 05:48

## 2021-10-15 RX ADMIN — OXYCODONE HYDROCHLORIDE 10 MILLIGRAM(S): 5 TABLET ORAL at 08:53

## 2021-10-15 RX ADMIN — APIXABAN 2.5 MILLIGRAM(S): 2.5 TABLET, FILM COATED ORAL at 08:52

## 2021-10-15 NOTE — PROGRESS NOTE ADULT - PROVIDER SPECIALTY LIST ADULT
Orthopedics
Orthopedics
Pharmacy
Hospitalist
Hospitalist
Orthopedics
Orthopedics
Hospitalist
Hospitalist
Orthopedics

## 2021-10-15 NOTE — PROGRESS NOTE ADULT - SUBJECTIVE AND OBJECTIVE BOX
INTERVAL HPI/OVERNIGHT EVENTS:   [facility]  used    Patient seen and examined.  Ate 50% of breakfast.  No fevers, chills, sweats, dizziness, HA, changes in vision, cp, palpitations, sob, persistent cough, n/v/d, abd pain, dysuria, focal weakness, or calf pain.     REVIEW OF SYSTEMS:  See HPI,  all others negative    PHYSICAL EXAM:  Vital Signs Last 24 Hrs  T(C): 36.3 (15 Oct 2021 08:19), Max: 36.9 (14 Oct 2021 23:25)  T(F): 97.4 (15 Oct 2021 08:19), Max: 98.5 (14 Oct 2021 23:25)  HR: 91 (15 Oct 2021 08:19) (91 - 102)  BP: 115/76 (15 Oct 2021 08:19) (104/70 - 121/79)  BP(mean): --  RR: 20 (15 Oct 2021 08:19) (14 - 20)  SpO2: 95% (15 Oct 2021 08:19) (92% - 95%)    GENERAL: NAD, well-groomed, well-developed, awake, alert, oriented x 3, fluent and coherent speech   EYES: EOMI, PERRLA, conjunctiva and sclera clear  ENMT: No tonsillar erythema, exudates, or enlargement   NECK: Supple, No JVD, No Cervical LAD   NERVOUS SYSTEM:  Good concentration; Moving all 4 extremities against gravity; No gross sensory deficits, No facial droop  CHEST/LUNG: Clear to auscultation bilaterally with good air entry; No rales, rhonchi, wheezing, or rubs  HEART: No murmurs, rubs, or gallops  ABDOMEN: Soft, Nontender, Nondistended, Bowel sounds present, No palpable masses or organomegaly, No bruits  EXTREMITIES:  2+ Peripheral Pulses, No clubbing, cyanosis, or edema, + calf tenderness R > L  WOUND: prineo, c/d/i, no drainage    Diagnostic Testing:                        no new labs today        INTERVAL HPI/OVERNIGHT EVENTS:   [facility]  used    Patient seen and examined.  Ate 50% of breakfast.  No fevers, chills, sweats, dizziness, HA, changes in vision, cp, palpitations, sob, persistent cough, n/v/d, abd pain, dysuria, focal weakness, or calf pain.     REVIEW OF SYSTEMS:  See HPI,  all others negative    PHYSICAL EXAM:  Vital Signs Last 24 Hrs  T(C): 36.3 (15 Oct 2021 08:19), Max: 36.9 (14 Oct 2021 23:25)  T(F): 97.4 (15 Oct 2021 08:19), Max: 98.5 (14 Oct 2021 23:25)  HR: 91 (15 Oct 2021 08:19) (91 - 102)  BP: 115/76 (15 Oct 2021 08:19) (104/70 - 121/79)  BP(mean): --  RR: 20 (15 Oct 2021 08:19) (14 - 20)  SpO2: 95% (15 Oct 2021 08:19) (92% - 95%)    GENERAL: NAD, well-groomed, well-developed, awake, alert, oriented x 3, fluent and coherent speech   EYES: EOMI, PERRLA, conjunctiva and sclera clear  ENMT: No tonsillar erythema, exudates, or enlargement   NECK: Supple, No JVD, No Cervical LAD   NERVOUS SYSTEM:  Good concentration; Moving all 4 extremities against gravity; No gross sensory deficits, No facial droop  CHEST/LUNG: Clear to auscultation bilaterally with good air entry; No rales, rhonchi, wheezing, or rubs  HEART: No murmurs, rubs, or gallops  ABDOMEN: Soft, Nontender, Nondistended, Bowel sounds present, No palpable masses or organomegaly, No bruits  EXTREMITIES:  2+ Peripheral Pulses, No clubbing, cyanosis, or edema, + calf tenderness R > L  WOUND: prineo, c/d/i, no drainage    Diagnostic Testing:                        no new labs today, last set of labs stable

## 2021-10-15 NOTE — PROGRESS NOTE ADULT - ASSESSMENT
81F Atrial Fibrillation, HTN, HLD, GERD and OA admitted for aftercare following Right TKR.     S/P Right TKR 10/11/21    - Pain control  - Bowel regimen  - PT/OT  - Incentive spirometry  - VTE PPx - will be on eliquis for afib  - calf tenderness to palpation R>L -> b/l LE dopplers negative for DVT  - stable for discharge from medical perspective   - Dispo:  Home vs. [facility]    Atrial Fibrillation / HTN / HLD - BP and Rate controlled. Continue Amlodipine and Statin; Hold Losartan until POD#2.  Hold HCTZ until discharge.  Eliquis dose adjusted down to 2.5mg BID given age > 80 and wt <60kg    GERD - PPI

## 2021-10-15 NOTE — PROGRESS NOTE ADULT - SUBJECTIVE AND OBJECTIVE BOX
SUBJECTIVE: Patient seen and examined. No nausea/vomiting, nor shortness of breath. Complaining of pain to that right knee.    OBJECTIVE:     Vital Signs Last 24 Hrs  T(C): 36.9 (14 Oct 2021 23:25), Max: 36.9 (14 Oct 2021 23:25)  T(F): 98.5 (14 Oct 2021 23:25), Max: 98.5 (14 Oct 2021 23:25)  HR: 102 (14 Oct 2021 23:25) (95 - 102)  BP: 110/72 (14 Oct 2021 23:25) (104/70 - 121/79)  BP(mean): --  RR: 16 (14 Oct 2021 23:25) (14 - 16)  SpO2: 93% (14 Oct 2021 23:25) (92% - 94%)    PAIN SCORE:   ?    SCALE USED: (1-10 VNRS)        Affected extremity:          Dressing: clean/dry/intact surgical incision of the right total knee replacement with no dressing on. PAS to bilateral. Surgical incision is healing with prineo closure. Mild ecchymosis on the surround tissue of the surgical incision. No   errythema, no signs of cellulitis. Placed a stockingette with an ABD over the right TKR         Sensation: intact to light touch          Motor exam:          5/ 5 Tibialis Anterior/Gastrocnemius-Soleus , EHL         warm well perfused; capillary refill <3 seconds     LABS:                        12.4   9.35  )-----------( 238      ( 14 Oct 2021 08:23 )             36.5     10-14    133<L>  |  98  |  18  ----------------------------<  90  3.8   |  31  |  0.57    Ca    7.9<L>      14 Oct 2021 08:23      MEDICATIONS:    Anticoagulation:  apixaban 2.5 milliGRAM(s) Oral every 12 hours      Antibiotics: finished protocol prophylactic antibiotics      Pain medications:   acetaminophen   Tablet .. 1000 milliGRAM(s) Oral every 8 hours  celecoxib 100 milliGRAM(s) Oral every 12 hours  HYDROmorphone  Injectable 0.5 milliGRAM(s) IV Push every 3 hours PRN  oxyCODONE    IR 5 milliGRAM(s) Oral every 3 hours PRN  oxyCODONE    IR 10 milliGRAM(s) Oral every 3 hours PRN  pregabalin 50 milliGRAM(s) Oral two times a day      A/P :  s/p Right  TKR POD # 4  -    Pain control  -    DVT ppx: Eliquis  -    Check AM labs  -    Weight bearing status: WBAT   -    Physical Therapy  -    Dispo: Home vs rehab

## 2021-10-15 NOTE — PROGRESS NOTE ADULT - REASON FOR ADMISSION
Patient is a 81y old  Female who presents with a chief complaint of s/p right total knee replacement (15 Oct 2021 08:05)
right total knee replacement
s/p right total knee replacement
Patient is a 81y old  Female who presents with a chief complaint of Patient is a 81y old  Female who presents with a chief complaint of right total knee replacement (11 Oct 2021 15:24) (13 Oct 2021 09:18)
Patient is a 81y old  Female who presents with a chief complaint of right total knee replacement (11 Oct 2021 15:24)

## 2021-10-25 ENCOUNTER — NON-APPOINTMENT (OUTPATIENT)
Age: 81
End: 2021-10-25

## 2021-10-25 ENCOUNTER — APPOINTMENT (OUTPATIENT)
Dept: ORTHOPEDIC SURGERY | Facility: CLINIC | Age: 81
End: 2021-10-25
Payer: MEDICARE

## 2021-10-25 VITALS — DIASTOLIC BLOOD PRESSURE: 68 MMHG | HEART RATE: 100 BPM | SYSTOLIC BLOOD PRESSURE: 111 MMHG

## 2021-10-25 PROCEDURE — 99024 POSTOP FOLLOW-UP VISIT: CPT

## 2021-10-25 PROCEDURE — 73562 X-RAY EXAM OF KNEE 3: CPT | Mod: RT

## 2021-10-25 RX ORDER — HYDROMORPHONE HYDROCHLORIDE 2 MG/1
2 TABLET ORAL EVERY 6 HOURS
Qty: 80 | Refills: 0 | Status: ACTIVE | COMMUNITY
Start: 2021-10-25 | End: 1900-01-01

## 2021-10-25 RX ORDER — AMOXICILLIN 500 MG/1
500 CAPSULE ORAL
Qty: 20 | Refills: 4 | Status: ACTIVE | COMMUNITY
Start: 2021-10-25 | End: 1900-01-01

## 2021-10-25 NOTE — HISTORY OF PRESENT ILLNESS
[___ Weeks Post Op] : [unfilled] weeks post op [Clean/Dry/Intact] : clean, dry and intact [Doing Well] : is doing well [Excellent Pain Control] : has excellent pain control [No Sign of Infection] : is showing no signs of infection [None] : None [3] : the patient reports pain that is 3/10 in severity [Healed] : healed [Neuro Intact] : an unremarkable neurological exam [Vascular Intact] : ~T peripheral vascular exam normal [Negative Jona's] : maneuvers demonstrated a negative Jona's sign [Xray (Date:___)] : [unfilled] Xray -  [Hardware in Good Position] : hardware in good position [Chills] : no chills [Fever] : no fever [Nausea] : no nausea [Vomiting] : no vomiting [Erythema] : not erythematous [Discharge] : absent of discharge [de-identified] : 10/11/21 Rt TKR  [de-identified] : Patient had an approximate 4-day hospital stay because of instability with the right knee after surgery.  She is here today for Prineo removal Steri-Strips and x-ray patient is using oxycodone approximately twice a day with significant GI upset and will be switched [de-identified] : The wound is well-healed, neurovascular status is intact. Good sensation and pulses distally good strength against resistance and EHL and dorsiflexion.\par There is no anterior drawer. There is no extensor lag or AP laxity. There is no significant mediolateral instability. Extension is the midline. Calf is nontender\par The range of motion is 0-100\par  [de-identified] : Radiographs of the [ RT  ] knee were performed today. There are stable interfaces between bone, cement, and implant in all 3 components. There is stable positioning of the femoral, tibial, and patellar components. There is equidistant spacing on each side of the tibial bearing. There is no fracture, foreign body, subsidement, osteolysis, or notable effusion. The patellar component is tracking centrally in the trochlear groove of the femoral component.\par \par  [de-identified] : Patient will be switched to hydromorphone 2 mg as required usually once or twice a day was advised Benadryl for evening to sleep and hopefully not require the narcotic patient will also be given a prescription for amoxicillin for dental prophylaxis she will be seen back in 6 weeks time with Dr. Lobo

## 2021-10-26 PROCEDURE — C1713: CPT

## 2021-10-26 PROCEDURE — 85730 THROMBOPLASTIN TIME PARTIAL: CPT

## 2021-10-26 PROCEDURE — 97161 PT EVAL LOW COMPLEX 20 MIN: CPT

## 2021-10-26 PROCEDURE — 88311 DECALCIFY TISSUE: CPT

## 2021-10-26 PROCEDURE — 97535 SELF CARE MNGMENT TRAINING: CPT

## 2021-10-26 PROCEDURE — 88305 TISSUE EXAM BY PATHOLOGIST: CPT

## 2021-10-26 PROCEDURE — 73560 X-RAY EXAM OF KNEE 1 OR 2: CPT

## 2021-10-26 PROCEDURE — 85027 COMPLETE CBC AUTOMATED: CPT

## 2021-10-26 PROCEDURE — 97116 GAIT TRAINING THERAPY: CPT

## 2021-10-26 PROCEDURE — 94664 DEMO&/EVAL PT USE INHALER: CPT

## 2021-10-26 PROCEDURE — 93005 ELECTROCARDIOGRAM TRACING: CPT

## 2021-10-26 PROCEDURE — U0003: CPT

## 2021-10-26 PROCEDURE — 85610 PROTHROMBIN TIME: CPT

## 2021-10-26 PROCEDURE — 97165 OT EVAL LOW COMPLEX 30 MIN: CPT

## 2021-10-26 PROCEDURE — 36415 COLL VENOUS BLD VENIPUNCTURE: CPT

## 2021-10-26 PROCEDURE — U0005: CPT

## 2021-10-26 PROCEDURE — C1776: CPT

## 2021-10-26 PROCEDURE — 86769 SARS-COV-2 COVID-19 ANTIBODY: CPT

## 2021-10-26 PROCEDURE — 97110 THERAPEUTIC EXERCISES: CPT

## 2021-10-26 PROCEDURE — 80048 BASIC METABOLIC PNL TOTAL CA: CPT

## 2021-10-26 PROCEDURE — 85014 HEMATOCRIT: CPT

## 2021-10-26 PROCEDURE — 85018 HEMOGLOBIN: CPT

## 2021-10-26 PROCEDURE — 93970 EXTREMITY STUDY: CPT

## 2021-10-26 PROCEDURE — C1889: CPT

## 2021-10-26 PROCEDURE — 97530 THERAPEUTIC ACTIVITIES: CPT

## 2021-11-01 RX ORDER — OXYCODONE 5 MG/1
5 TABLET ORAL
Qty: 30 | Refills: 0 | Status: ACTIVE | COMMUNITY
Start: 2021-11-01 | End: 1900-01-01

## 2021-12-07 RX ORDER — OXYCODONE 5 MG/1
5 TABLET ORAL
Qty: 30 | Refills: 0 | Status: ACTIVE | COMMUNITY
Start: 2021-12-07 | End: 1900-01-01

## 2021-12-14 ENCOUNTER — APPOINTMENT (OUTPATIENT)
Dept: ORTHOPEDIC SURGERY | Facility: CLINIC | Age: 81
End: 2021-12-14
Payer: MEDICARE

## 2021-12-14 VITALS — DIASTOLIC BLOOD PRESSURE: 87 MMHG | SYSTOLIC BLOOD PRESSURE: 131 MMHG | HEART RATE: 101 BPM

## 2021-12-14 PROCEDURE — 73562 X-RAY EXAM OF KNEE 3: CPT | Mod: RT

## 2021-12-14 PROCEDURE — 99024 POSTOP FOLLOW-UP VISIT: CPT

## 2021-12-14 RX ORDER — TRAMADOL HYDROCHLORIDE 50 MG/1
50 TABLET, COATED ORAL
Qty: 40 | Refills: 0 | Status: ACTIVE | COMMUNITY
Start: 2021-12-14 | End: 1900-01-01

## 2022-02-10 NOTE — OCCUPATIONAL THERAPY INITIAL EVALUATION ADULT - TRANSFER TRAINING, PT EVAL
I have personally seen and examined this patient.  I have fully participated in the care of this patient. I have reviewed all pertinent clinical information, including history, physical exam, plan and the Resident’s note and agree except as noted.
Goal: Pt will perform all functional transfers with supervision using the RW within 2 weeks.

## 2022-02-15 ENCOUNTER — APPOINTMENT (OUTPATIENT)
Dept: ORTHOPEDIC SURGERY | Facility: CLINIC | Age: 82
End: 2022-02-15
Payer: MEDICARE

## 2022-02-15 VITALS — DIASTOLIC BLOOD PRESSURE: 79 MMHG | SYSTOLIC BLOOD PRESSURE: 123 MMHG | HEART RATE: 110 BPM

## 2022-02-15 PROCEDURE — 73562 X-RAY EXAM OF KNEE 3: CPT | Mod: RT

## 2022-02-15 PROCEDURE — 99214 OFFICE O/P EST MOD 30 MIN: CPT

## 2022-02-15 NOTE — HISTORY OF PRESENT ILLNESS
[Stable] : stable [Walking] : walking [de-identified] : Patient is a 82-year-old female who presents today for evaluation regarding her right knee. She is status post right total knee replacement on October 11, 2021. Overall she states she's been doing fairly well. Some pain on and off. She does state however that she does have no visible rash but has noticed any itching sensation throughout her entire body. With her doctor prescribed Claritin. She does not recall any change in her medication other than stopping her tramadol. She presents today for evaluation regarding her right knee.

## 2022-02-15 NOTE — REASON FOR VISIT
[Follow-Up Visit] : a follow-up visit for [Artificial Knee Joint] : artificial knee joint [FreeTextEntry2] : Rt TKR 10/11/21 Paqin 6/10 intermittent C/O itching no rash

## 2022-02-15 NOTE — PHYSICAL EXAM
[Antalgic] : antalgic [LE] : Sensory: Intact in bilateral lower extremities [ALL] : dorsalis pedis, posterior tibial, femoral, popliteal, and radial 2+ and symmetric bilaterally [de-identified] : On physical examination of the right knee. Patient is status post right total knee replacement. There is a well-healed surgical scar with no evidence of infection superficial or deep. There is no redness swelling or discharge noted. She is nontender on examination. Has adequate range of motion in all planes without reproducible pain. There is no evidence of ligamentous laxity. No evidence of any muscle atrophy. No evidence of any motor or sensory deficit. Patient does have good distal pulses with no calf tenderness. [de-identified] : X-rays of the right knee reveal status post right total knee replacement. The hardware is in place and shows a alignment as well as fixation. There is no evidence of any fracture dislocation or loosening of any hardware.

## 2022-02-15 NOTE — DISCUSSION/SUMMARY
[de-identified] : Plan the patient is to continue with the present level of activities. This includes a good home exercise program along with ice pack/moist heat and anti-inflammatories. She is advised to follow up in the office in another year for reevaluation management. However she expresses any increasing pain or discomfort to notify the office.\par \par I saw and evaluated the patient. I discussed and reviewed the case details with the PA and agree with the PA's findings and plan as documented in the PA note.\par \par

## 2022-03-09 NOTE — PHYSICAL THERAPY INITIAL EVALUATION ADULT - SITTING BALANCE: DYNAMIC
good minus Odomzo Counseling- I discussed with the patient the risks of Odomzo including but not limited to nausea, vomiting, diarrhea, constipation, weight loss, changes in the sense of taste, decreased appetite, muscle spasms, and hair loss.  The patient verbalized understanding of the proper use and possible adverse effects of Odomzo.  All of the patient's questions and concerns were addressed.

## 2022-06-07 ENCOUNTER — APPOINTMENT (OUTPATIENT)
Dept: ORTHOPEDIC SURGERY | Facility: CLINIC | Age: 82
End: 2022-06-07
Payer: MEDICARE

## 2022-06-07 VITALS — DIASTOLIC BLOOD PRESSURE: 83 MMHG | HEART RATE: 73 BPM | SYSTOLIC BLOOD PRESSURE: 122 MMHG

## 2022-06-07 DIAGNOSIS — Z96.651 PRESENCE OF RIGHT ARTIFICIAL KNEE JOINT: ICD-10-CM

## 2022-06-07 PROCEDURE — 73562 X-RAY EXAM OF KNEE 3: CPT | Mod: RT

## 2022-06-07 PROCEDURE — 99212 OFFICE O/P EST SF 10 MIN: CPT

## 2022-06-15 ENCOUNTER — APPOINTMENT (OUTPATIENT)
Dept: ORTHOPEDIC SURGERY | Facility: CLINIC | Age: 82
End: 2022-06-15
Payer: MEDICARE

## 2022-06-15 VITALS
HEIGHT: 60 IN | WEIGHT: 135 LBS | DIASTOLIC BLOOD PRESSURE: 80 MMHG | BODY MASS INDEX: 26.5 KG/M2 | SYSTOLIC BLOOD PRESSURE: 117 MMHG | HEART RATE: 81 BPM

## 2022-06-15 PROCEDURE — 72170 X-RAY EXAM OF PELVIS: CPT

## 2022-06-15 PROCEDURE — 72110 X-RAY EXAM L-2 SPINE 4/>VWS: CPT

## 2022-06-15 PROCEDURE — 99214 OFFICE O/P EST MOD 30 MIN: CPT

## 2022-06-15 PROCEDURE — 72050 X-RAY EXAM NECK SPINE 4/5VWS: CPT

## 2022-06-15 NOTE — PHYSICAL EXAM
[Stooped] : stooped [] : Motor: [UE Motor Strength NL] : Motor strength of the bilateral upper extremities is normal [NL] : Motor strength of the right lower extremity was normal [___/5] : left ([unfilled]/5) [Bicep] : biceps 2+ and symmetric bilaterally [B.R.] : biceps 2+ and symmetric bilaterally [Tricep] : triceps 2+ and symmetric bilaterally [Knee] : patellar 2+ and symmetric bilaterally [Ankle] : ankle 2+ and symmetric bilaterally [DP] : dorsalis pedis 2+ and symmetric bilaterally [Rad] : radial 2+ and symmetric bilaterally [Caro's Sign] : negative Caro's sign [SLR] : negative straight leg raise [de-identified] : Seen with her son\par The pt is awake, alert and oriented to self, place and time, is comfortable and in no acute distress. Inspection of neck, back and lower extremities bilaterally reveals no rashes or ecchymotic lesions.  There is no obvious abnormal spinal curvature in the sagittal and coronal planes. There is no tenderness over the cervical, thoracic or lumbar spine, or the paraspinal or upper and lower extremities musculature. There is no sacroiliac tenderness. No greater trochanteric tenderness bilaterally. No atrophy or abnormal movements noted in the upper or lower extremities. There is no swelling noted in the upper or lower extremities bilaterally. No cervical lymphadenopathy noted anteriorly. No joint laxity noted in the upper and lower extremity joints bilaterally.\par Hip range of motion is degrees internal rotation 30° external rotation without pain. Full range of motion of the shoulders bilaterally with no significant pain\par There is no groin pain with hip internal rotation and a negative PAULETTE test bilaterally.  [de-identified] : 4 views cervical spine obtained today demonstrate no significant scoliosis.  Straightening of cervical lordosis.  Significant disc degeneration at C3-4 and C4-5 C5-6 and C6-7.  Grade 1-2 spinal listhesis at C4-5 with suggestion of improvement spondylolisthesis extension.  No obvious acute fractures.\par \par 4 views lumbar spine demonstrate partially visualized right hip ORIF intramedullary nail.  Grade 2 spondylolisthesis at L5-S1 which measures approximate 9 mm and 7 mm anterolisthesis L4-5.  Degenerative changes L5-S1 and to lesser extent L4-5.  Improvement of L4-5 spinal listhesis and 4 mm extension suggesting some dynamic instability.  L5-S1 is unchanged.  Remaining lumbar disks appear normally aligned.  Trace calcification abdominal aorta.  No acute fractures.\par \par AP pelvis demonstrates partially visualized right hip intramedullary nail.  Some heterotopic ossification.  No acute fractures.  Diffuse osteopenia.  Suggestion of old inferior pubic ramus fracture left

## 2022-06-15 NOTE — HISTORY OF PRESENT ILLNESS
[Worsening] : worsening [9] : a current pain level of 9/10 [Walking] : walking [Prolonged Standing] : worsened by prolonged standing [Standing] : worsened by standing [Rest] : relieved by rest [___ mths] : [unfilled] month(s) ago [de-identified] : Patient is here today referral  for evaluation on her neck bilateral arm pain and low back bilateral leg pain going on for awhile. No known injury and not medically treated on saw her PCP\par NEck pain and lower back pain radiating down to the legs, numbness and tingling of toes.\par Hx of R hip fx ORIF ~5 yrs, R TKR 10/2021 \par LBP started about 4 years ago [de-identified] : tramadol  rolling walker

## 2022-06-15 NOTE — DISCUSSION/SUMMARY
[Medication Risks Reviewed] : Medication risks reviewed [de-identified] : Patient presents with symptoms of gait instability and symptoms primarily radiating to her legs and increasing numbness tingling and pain.  She has spondylolisthesis with disc degeneration at multiple levels in the cervical spine and I recommended cervical spine MRI to assess for spinal cord compression and cervical stenosis.  She also has a grade 2 spinal listhesis L4-5 and L5-S1 recommend MRI lumbar spine for further evaluation.  Further treatment options can be discussed based on MRI findings.  This may include surgical interventions plus physical therapy injections and medications as appropriate.  For now they are not interested in prescription medication and will continue tramadol Lyrica and acetaminophen previously prescribed.

## 2022-06-28 ENCOUNTER — RESULT REVIEW (OUTPATIENT)
Age: 82
End: 2022-06-28

## 2022-06-28 ENCOUNTER — OUTPATIENT (OUTPATIENT)
Dept: OUTPATIENT SERVICES | Facility: HOSPITAL | Age: 82
LOS: 1 days | End: 2022-06-28
Payer: MEDICARE

## 2022-06-28 ENCOUNTER — APPOINTMENT (OUTPATIENT)
Dept: MRI IMAGING | Facility: CLINIC | Age: 82
End: 2022-06-28

## 2022-06-28 DIAGNOSIS — M54.12 RADICULOPATHY, CERVICAL REGION: ICD-10-CM

## 2022-06-28 DIAGNOSIS — Z98.890 OTHER SPECIFIED POSTPROCEDURAL STATES: Chronic | ICD-10-CM

## 2022-06-28 DIAGNOSIS — M50.30 OTHER CERVICAL DISC DEGENERATION, UNSPECIFIED CERVICAL REGION: ICD-10-CM

## 2022-06-28 DIAGNOSIS — M43.10 SPONDYLOLISTHESIS, SITE UNSPECIFIED: ICD-10-CM

## 2022-06-28 PROCEDURE — 72148 MRI LUMBAR SPINE W/O DYE: CPT | Mod: ME

## 2022-06-28 PROCEDURE — G1004: CPT

## 2022-06-28 PROCEDURE — 72148 MRI LUMBAR SPINE W/O DYE: CPT | Mod: 26,ME

## 2022-06-28 PROCEDURE — 72141 MRI NECK SPINE W/O DYE: CPT | Mod: 26,MH

## 2022-06-28 PROCEDURE — 72141 MRI NECK SPINE W/O DYE: CPT

## 2022-07-06 ENCOUNTER — APPOINTMENT (OUTPATIENT)
Dept: ORTHOPEDIC SURGERY | Facility: CLINIC | Age: 82
End: 2022-07-06

## 2022-07-06 VITALS
BODY MASS INDEX: 25.91 KG/M2 | WEIGHT: 132 LBS | SYSTOLIC BLOOD PRESSURE: 111 MMHG | HEIGHT: 60 IN | HEART RATE: 88 BPM | DIASTOLIC BLOOD PRESSURE: 74 MMHG

## 2022-07-06 PROCEDURE — 99214 OFFICE O/P EST MOD 30 MIN: CPT

## 2022-07-06 RX ORDER — ACETAMINOPHEN 325 MG/1
325 TABLET ORAL
Qty: 30 | Refills: 0 | Status: ACTIVE | COMMUNITY
Start: 2022-06-28

## 2022-07-06 RX ORDER — LORATADINE 10 MG/1
10 TABLET ORAL
Qty: 30 | Refills: 0 | Status: ACTIVE | COMMUNITY
Start: 2022-02-01

## 2022-07-06 RX ORDER — CALCIUM ACETATE 667 MG/1
667 CAPSULE ORAL
Qty: 90 | Refills: 0 | Status: ACTIVE | COMMUNITY
Start: 2022-06-28

## 2022-07-06 RX ORDER — GABAPENTIN 300 MG/1
300 CAPSULE ORAL
Qty: 60 | Refills: 0 | Status: ACTIVE | COMMUNITY
Start: 2022-07-06 | End: 1900-01-01

## 2022-07-06 RX ORDER — BETAMETHASONE DIPROPIONATE 0.5 MG/G
0.05 OINTMENT TOPICAL
Qty: 45 | Refills: 0 | Status: ACTIVE | COMMUNITY
Start: 2021-12-28

## 2022-07-06 NOTE — DISCUSSION/SUMMARY
[Medication Risks Reviewed] : Medication risks reviewed [de-identified] : MRI of the cervical and lumbar spine previously performed was independently reviewed by me and findings discussed with the patient and her son today.  She has severe spinal stenosis in the cervical spine with cord compression no obvious myelomalacia changes.  She would benefit from a cervical laminectomy and fusion from C3-C6 versus C7.\par \par She also has severe spinal stenosis at the L4-5 level with degenerative change at L4-5 and L5-S1 and suggestion of instability with fluid signal in the facets at the L4-5 level.  She would benefit from laminectomy and fusion from L4-S1.\par \par The need for surgical intervention to improve her condition were discussed at length with the patient and her son today.  The patient is reluctant to consider surgery and understands that she would need both a cervical decompression stabilization as well as lumbar laminectomy and fusion.  As an alternative surgery we discussed the option of physical therapy which was prescribed for her.  Gabapentin was also prescribed on a trial basis.  She has taken pregabalin without significant benefit.  She has found tramadol helpful but I advised her against use of narcotic pain medications.

## 2022-07-06 NOTE — PHYSICAL EXAM
[Stooped] : stooped [] : Motor: [UE Motor Strength NL] : Motor strength of the bilateral upper extremities is normal [NL] : Motor strength of the right lower extremity was normal [___/5] : left ([unfilled]/5) [Bicep] : biceps 2+ and symmetric bilaterally [B.R.] : biceps 2+ and symmetric bilaterally [Tricep] : triceps 2+ and symmetric bilaterally [Knee] : patellar 2+ and symmetric bilaterally [Ankle] : ankle 2+ and symmetric bilaterally [DP] : dorsalis pedis 2+ and symmetric bilaterally [Rad] : radial 2+ and symmetric bilaterally [Caro's Sign] : negative Caro's sign [SLR] : negative straight leg raise [de-identified] : Seen with her son\par The pt is awake, alert and oriented to self, place and time, is comfortable and in no acute distress. Inspection of neck, back and lower extremities bilaterally reveals no rashes or ecchymotic lesions.  There is no obvious abnormal spinal curvature in the sagittal and coronal planes. There is no tenderness over the cervical, thoracic or lumbar spine, or the paraspinal or upper and lower extremities musculature. There is no sacroiliac tenderness. No greater trochanteric tenderness bilaterally. No atrophy or abnormal movements noted in the upper or lower extremities. There is no swelling noted in the upper or lower extremities bilaterally. No cervical lymphadenopathy noted anteriorly. No joint laxity noted in the upper and lower extremity joints bilaterally.\par Hip range of motion is degrees internal rotation 30° external rotation without pain. Full range of motion of the shoulders bilaterally with no significant pain\par There is no groin pain with hip internal rotation and a negative PAULETTE test bilaterally.  [de-identified] : EXAM: [id number] - MR SPINE CERVICAL - ORDERED BY: MACRINA SHIPLEY\par \par PROCEDURE DATE: 06/28/2022\par \par INTERPRETATION: EXAMINATION:MR CERVICAL SPINE\par \par CLINICAL INDICATION:Neck and arm pain.\par \par TECHNIQUE: Multi-planar, multi-sequence MR of the cervical spine was performed without intravenous contrast.\par \par COMPARISON: CT cervical spine dated 11/12/2009\par \par INTERPRETATION:\par \par BONES AND BONE MARROW: There is extensive osteophyte formation and endplate discogenic edema throughout the cervical spine.\par INTERVERTEBRAL DISCS: Severe diffuse loss of disc height at C3-4 through C6-7 levels.\par ALIGNMENT: The craniocervical junction and atlantoaxial intervals are maintained. Straightening of the cervical lordosis is present. Mild anterolisthesis of C7 on T1 and T1 on T2.\par SPINAL CORD: The spinal cord is normal in size and signal intensity.\par EXTRASPINAL: There is no prevertebral soft tissue swelling. There is no epidural mass or fluid collection. The paraspinal and included extraspinal soft tissues are unremarkable.\par \par Evaluation of individual disc space levels demonstrates the following:\par \par C2-C3: Small central disc protrusion. There is no significant spinal canal or neural foraminal stenosis.\par C3-C4: Large posterior disc osteophyte complex. Uncovertebral spurring. Moderate bilateral neural foraminal stenosis. Moderate central stenosis.\par C4-C5: Large posterior disc osteophyte complex. Uncovertebral spurring. Moderate bilateral neural foraminal stenosis. Moderate central stenosis.\par C5-C6: Large posterior disc osteophyte complex. Uncovertebral spurring. Moderate right and severe left neural foraminal stenosis. Moderate central stenosis.\par C6-C7: Broad-based posterior disc bulging. Mild bilateral neural foraminal stenosis. Mild central stenosis.\par C7-T1: There is no significant spinal canal or neural foraminal stenosis.\par \par IMPRESSION:\par Severe multilevel cervical spondylosis worst at C3-4, C4-5 and C5-6. Moderate central stenosis at those levels, and moderate to severe neural foraminal stenosis as detailed.\par \par --- End of Report ---\par \par SHLOMIT A GOLDBERG-STEIN MD; Attending Radiologist\par This document has been electronically signed. Jul 1 2022 6:57PM\par \par -----------\par \par EXAM: [id number] - MR SPINE LUMBAR - ORDERED BY: MACRINA SHIPLEY\par \par PROCEDURE DATE: 06/28/2022\par \par INTERPRETATION: EXAMINATION: MR LUMBAR SPINE\par \par CLINICAL INDICATION: Back pain. Low back muscle strain.\par \par TECHNIQUE: Multi-planar, multi-sequence MR of the lumbar spine was performed without intravenous contrast.\par \par COMPARISON: MRI dated 6/7/2019 and 7/22/2015\par \par INTERPRETATION:\par \par BONES AND BONE MARROW: There is no evidence of fracture. There is mild endplate discogenic edema at L5-S1.\par INTERVERTEBRAL DISCS: There is multilevel disc desiccation throughout the lumbar spine. There is mild loss of disc height at L5-S1.\par ALIGNMENT: There is degenerative stepwise grade 1 anterolisthesis of L4 on L5 and L5 on S1.\par CONUS AND CAUDA EQUINA: The conus medullaris is normal in size, signal and location. The conus terminates at the L1 level.\par EXTRASPINAL: There is no epidural mass or fluid collection. The paraspinal and included extraspinal soft tissues are unremarkable. There are bilateral renal cysts including a large left upper pole cyst.\par \par Evaluation of individual lumbar disc space levels demonstrates the following:\par \par L1/L2, small posterior disc bulge. There is no significant spinal canal or neural foraminal stenosis.\par L2/L3, mild posterior disc bulge and moderate facet arthropathy. Mild neural foraminal stenosis bilaterally. No central stenosis.\par L3/L4, broad-based posterior disc bulge and moderate facet arthropathy. Mild bilateral neural foraminal stenosis. Mild central canal narrowing.\par L4/L5, degenerative anterolisthesis with moderate facet arthropathy with effusions bilaterally. Uncovering of the posterior disc. Severe bilateral neural foraminal stenosis. Severe central stenosis. Findings are similar to prior.\par L5/S1, marked hypertrophic facet arthropathy with uncovering of the posterior disc and severe bilateral neural foraminal stenosis. Severe central stenosis. Findings are similar to prior.\par \par IMPRESSION:\par Severe lumbar spondylosis at L4-5 and L5-S1 with degenerative anterolisthesis, severe bilateral neural foraminal stenosis, and severe central stenosis at these levels. Findings are similar to the prior MRI.\par \par Other unchanged milder findings elsewhere as above.\par \par --- End of Report ---\par \par SHLOMIT A GOLDBERG-STEIN MD; Attending Radiologist\par This document has been electronically signed. Jul 1 2022 7:01PM

## 2022-07-06 NOTE — REASON FOR VISIT
[Follow-Up Visit] : a follow-up visit for [Degenerative Joint Disease] : degenerative joint disease [Back Pain] : back pain [Neck Pain] : neck pain [Radiculopathy] : radiculopathy [Spondylolistheses] : spondylolistheses [Family Member] : family member

## 2022-07-06 NOTE — HISTORY OF PRESENT ILLNESS
[8] : a current pain level of 8/10 [Daily] : ~He/She~ states the symptoms seem to be occuring daily [Prolonged Standing] : worsened by prolonged standing [Standing] : worsened by standing [Walking] : worsened by walking [Rest] : relieved by rest [de-identified] : Patient is here today to review mri's cervical and lumbar 6/28/22. Patient's son states both legs are going numb especially with walking and standing.\par Takes tramadol and pregabalin

## 2022-10-11 ENCOUNTER — APPOINTMENT (OUTPATIENT)
Dept: ORTHOPEDIC SURGERY | Facility: CLINIC | Age: 82
End: 2022-10-11

## 2023-05-08 ENCOUNTER — APPOINTMENT (OUTPATIENT)
Dept: ORTHOPEDIC SURGERY | Facility: CLINIC | Age: 83
End: 2023-05-08
Payer: MEDICARE

## 2023-05-08 ENCOUNTER — NON-APPOINTMENT (OUTPATIENT)
Age: 83
End: 2023-05-08

## 2023-05-08 VITALS — HEART RATE: 76 BPM | SYSTOLIC BLOOD PRESSURE: 113 MMHG | DIASTOLIC BLOOD PRESSURE: 78 MMHG

## 2023-05-08 DIAGNOSIS — M51.37 OTHER INTERVERTEBRAL DISC DEGENERATION, LUMBOSACRAL REGION: ICD-10-CM

## 2023-05-08 DIAGNOSIS — M50.30 OTHER CERVICAL DISC DEGENERATION, UNSPECIFIED CERVICAL REGION: ICD-10-CM

## 2023-05-08 DIAGNOSIS — G62.9 POLYNEUROPATHY, UNSPECIFIED: ICD-10-CM

## 2023-05-08 DIAGNOSIS — M54.16 RADICULOPATHY, LUMBAR REGION: ICD-10-CM

## 2023-05-08 DIAGNOSIS — G89.29 LOW BACK PAIN, UNSPECIFIED: ICD-10-CM

## 2023-05-08 DIAGNOSIS — M48.062 SPINAL STENOSIS, LUMBAR REGION WITH NEUROGENIC CLAUDICATION: ICD-10-CM

## 2023-05-08 DIAGNOSIS — M25.569 PAIN IN UNSPECIFIED KNEE: ICD-10-CM

## 2023-05-08 DIAGNOSIS — M43.10 SPONDYLOLISTHESIS, SITE UNSPECIFIED: ICD-10-CM

## 2023-05-08 DIAGNOSIS — M54.50 LOW BACK PAIN, UNSPECIFIED: ICD-10-CM

## 2023-05-08 DIAGNOSIS — M54.12 RADICULOPATHY, CERVICAL REGION: ICD-10-CM

## 2023-05-08 DIAGNOSIS — M17.12 UNILATERAL PRIMARY OSTEOARTHRITIS, LEFT KNEE: ICD-10-CM

## 2023-05-08 PROCEDURE — 73562 X-RAY EXAM OF KNEE 3: CPT | Mod: 50

## 2023-05-08 PROCEDURE — 99213 OFFICE O/P EST LOW 20 MIN: CPT

## 2023-05-08 NOTE — PHYSICAL EXAM
[Antalgic] : antalgic [Stooped] : stooped [Shuffling] : shuffling [DP] : dorsalis pedis 2+ and symmetric bilaterally [PT] : posterior tibial 2+ and symmetric bilaterally [Normal RLE] : Right Lower Extremity: No scars, rashes, lesions, ulcers, skin intact [Normal LLE] : Left Lower Extremity: No scars, rashes, lesions, ulcers, skin intact [Normal Touch] : sensation intact for touch [Normal] : no peripheral adenopathy appreciated [de-identified] : Ki Knees 0-110 ROM, no ligament's laxity, no swelling.\par Good sensation to LE's at this time. [de-identified] : Ki Knee exams. 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

## 2023-05-08 NOTE — DISCUSSION/SUMMARY
[de-identified] : Patient ref for MRI of LS Spine and ref back to Spine.\par Regarding advanced fall risk, Home PT requested for ambulation strengthening and fall prevention.

## 2023-05-08 NOTE — HISTORY OF PRESENT ILLNESS
[Worsening] : worsening [10] : a maximum pain level of 10/10 [Walking] : worsened by walking [None] : No relieving factors are noted [de-identified] : Previously seen by spine with Cerv and LS DJD etc.\par Patient wants to be treated by Martínez Herr.\par Explained to son that he does not see LS problems and revered to  Dr Iraheta for FU.\par MRI ordered of LS for his review.

## 2023-07-21 NOTE — PHYSICAL THERAPY INITIAL EVALUATION ADULT - ASSISTIVE DEVICE FOR TRANSFER: STAND/SIT, REHAB EVAL
Patient complaining of right sided rib pain. Patient states he has been coughing a lot lately rolling walker

## 2023-10-01 NOTE — ED PROVIDER NOTE - PRINCIPAL DIAGNOSIS
I signed up for this patient in error. I did not see this patient. I did not participate in the evlauation or treatment of this patient.     Electronically signed by Kat Dasilva DO on 10/1/2023 at 3:34 PM
Chest pain

## 2025-01-21 NOTE — DISCHARGE NOTE PROVIDER - CARE PROVIDERS DIRECT ADDRESSES
,[email or web address] Maritza Guerrero MD  Division of Hospital Medicine  [facility]   Available on [employer] Teams (Mon-Fri 8am-5pm)    * messages preferred prior to calls  Other Times:  [phone]      Patient is a 65y old  Female who presents with a chief complaint of Concern for osteomyelitis (21 Jan 2025 14:49)      SUBJECTIVE / OVERNIGHT EVENTS: still with mild pruritis though was still on admelog this morning. switched to regular insulin sliding scale by lunch time. no fever, chills, n/v, abd pain. ongoing R lateral foot pain intermitently  ADDITIONAL REVIEW OF SYSTEMS:    MEDICATIONS  (STANDING):  atorvastatin 40 milliGRAM(s) Oral at bedtime  hydrALAZINE 75 milliGRAM(s) Oral three times a day  insulin NPH human recombinant 7 Unit(s) SubCutaneous every 12 hours  insulin regular  human corrective regimen sliding scale   SubCutaneous three times a day before meals  insulin regular  human corrective regimen sliding scale   SubCutaneous at bedtime  linagliptin 5 milliGRAM(s) Oral daily  metoprolol succinate ER 50 milliGRAM(s) Oral daily  piperacillin/tazobactam IVPB.. 3.375 Gram(s) IV Intermittent every 8 hours    MEDICATIONS  (PRN):  acetaminophen     Tablet .. 650 milliGRAM(s) Oral every 6 hours PRN Temp greater or equal to 38C (100.4F), Mild Pain (1 - 3)  diphenhydrAMINE Elixir 12.5 milliGRAM(s) Oral three times a day PRN Rash and/or Itching  diphenhydrAMINE Elixir 12.5 milliGRAM(s) Oral once PRN Rash and/or Itching  melatonin 3 milliGRAM(s) Oral at bedtime PRN Insomnia      CAPILLARY BLOOD GLUCOSE      POCT Blood Glucose.: 314 mg/dL (21 Jan 2025 17:57)  POCT Blood Glucose.: 244 mg/dL (21 Jan 2025 11:34)  POCT Blood Glucose.: 248 mg/dL (21 Jan 2025 07:26)  POCT Blood Glucose.: 281 mg/dL (20 Jan 2025 22:38)    I&O's Summary    20 Jan 2025 07:01  -  21 Jan 2025 07:00  --------------------------------------------------------  IN: 560 mL / OUT: 0 mL / NET: 560 mL        PHYSICAL EXAM:  Vital Signs Last 24 Hrs  T(C): 37.1 (21 Jan 2025 16:44), Max: 37.1 (21 Jan 2025 16:44)  T(F): 98.7 (21 Jan 2025 16:44), Max: 98.7 (21 Jan 2025 16:44)  HR: 68 (21 Jan 2025 16:44) (66 - 68)  BP: 120/69 (21 Jan 2025 16:44) (120/69 - 157/77)  BP(mean): --  RR: 18 (21 Jan 2025 16:44) (18 - 18)  SpO2: 94% (21 Jan 2025 16:44) (94% - 95%)    Parameters below as of 21 Jan 2025 16:44  Patient On (Oxygen Delivery Method): room air        CONSTITUTIONAL: NAD, well-developed, well-groomed  EYES: PERRLA; conjunctiva and sclera clear  ENMT: Moist oral mucosa, no pharyngeal injection or exudates; normal dentition  NECK: Supple, no palpable masses; no thyromegaly  RESPIRATORY: Normal respiratory effort; lungs are clear to auscultation bilaterally  CARDIOVASCULAR: Regular rate and rhythm, normal S1 and S2, no murmur/rub/gallop  ABDOMEN: Soft, Nondistended, Nontender to palpation, normoactive bowel sounds  MUSCULOSKELETAL: No clubbing or cyanosis of digits; no joint swelling or tenderness to palpation  PSYCH: A+O to person, place, and time; affect appropriate  NEUROLOGY: CN 2-12 are intact and symmetric; no gross sensory deficits   SKIN: No rashes; no palpable lesions, +b/l LE in dressing and wrapped      LABS:                        10.1   6.56  )-----------( 256      ( 21 Jan 2025 01:23 )             31.3     01-21    138  |  104  |  40[H]  ----------------------------<  252[H]  3.6   |  24  |  1.72[H]    Ca    9.3      21 Jan 2025 01:23    TPro  7.0  /  Alb  3.2[L]  /  TBili  0.3  /  DBili  x   /  AST  13  /  ALT  12  /  AlkPhos  89  01-20          Urinalysis Basic - ( 21 Jan 2025 01:23 )    Color: x / Appearance: x / SG: x / pH: x  Gluc: 252 mg/dL / Ketone: x  / Bili: x / Urobili: x   Blood: x / Protein: x / Nitrite: x   Leuk Esterase: x / RBC: x / WBC x   Sq Epi: x / Non Sq Epi: x / Bacteria: x          RADIOLOGY & ADDITIONAL TESTS:  Results Reviewed:   Imaging Personally Reviewed:  Electrocardiogram Personally Reviewed:    COORDINATION OF CARE:  Care Discussed with Consultants/Other Providers [Y]: medicine ISABEL Tran  Prior or Outpatient Records Reviewed [Y/N]: